# Patient Record
Sex: FEMALE | Race: WHITE | NOT HISPANIC OR LATINO | Employment: OTHER | URBAN - METROPOLITAN AREA
[De-identification: names, ages, dates, MRNs, and addresses within clinical notes are randomized per-mention and may not be internally consistent; named-entity substitution may affect disease eponyms.]

---

## 2023-10-17 ENCOUNTER — ANCILLARY PROCEDURE (OUTPATIENT)
Dept: RADIOLOGY | Facility: CLINIC | Age: 68
End: 2023-10-17
Payer: MEDICARE

## 2023-10-17 DIAGNOSIS — M25.561 ACUTE PAIN OF RIGHT KNEE: ICD-10-CM

## 2023-10-17 PROCEDURE — 73562 X-RAY EXAM OF KNEE 3: CPT | Mod: RT

## 2023-11-03 ENCOUNTER — OFFICE VISIT (OUTPATIENT)
Dept: ORTHOPEDIC SURGERY | Facility: CLINIC | Age: 68
End: 2023-11-03
Payer: MEDICARE

## 2023-11-03 VITALS — WEIGHT: 212 LBS | BODY MASS INDEX: 33.27 KG/M2 | HEIGHT: 67 IN

## 2023-11-03 DIAGNOSIS — M25.561 RIGHT MEDIAL KNEE PAIN: Primary | ICD-10-CM

## 2023-11-03 PROCEDURE — 1159F MED LIST DOCD IN RCRD: CPT | Performed by: ORTHOPAEDIC SURGERY

## 2023-11-03 PROCEDURE — 99203 OFFICE O/P NEW LOW 30 MIN: CPT | Performed by: ORTHOPAEDIC SURGERY

## 2023-11-03 PROCEDURE — 1125F AMNT PAIN NOTED PAIN PRSNT: CPT | Performed by: ORTHOPAEDIC SURGERY

## 2023-11-03 PROCEDURE — 1160F RVW MEDS BY RX/DR IN RCRD: CPT | Performed by: ORTHOPAEDIC SURGERY

## 2023-11-03 ASSESSMENT — PAIN SCALES - GENERAL: PAINLEVEL_OUTOF10: 5 - MODERATE PAIN

## 2023-11-03 ASSESSMENT — PAIN - FUNCTIONAL ASSESSMENT: PAIN_FUNCTIONAL_ASSESSMENT: 0-10

## 2023-11-03 NOTE — PROGRESS NOTES
Subjective    Patient ID: Lacie Mcneal is a 68 y.o. female.    Chief Complaint: Follow-up of the Right Knee     Last Surgery: No surgery found  Last Surgery Date: No surgery found    HPI about a 2-year history of pain in this right knee at times it can give out on her most her pain is medial but she is also unable to kneel without significant pain she has had previous injuries and falls ever since she had a back surgery    Objective   Ortho Exam range of motion is -5 to 115 degrees mild mid flexion laxity neuro vas intact skins intact no effusion no distal edema no patellofemoral crepitus or apprehension feet are normal no pain with hip motion she does have medial joint line tenderness and a positive Toby    Image Results:  XR knee right 3 views  Narrative: Interpreted By:  Chris Bowling,   STUDY:  XR KNEE RIGHT 3 VIEWS      INDICATION:  Signs/Symptoms:RIGHT KNEE PAIN      COMPARISON:  March 7, 2017      ACCESSION NUMBER(S):  [id number]      ORDERING CLINICIAN:  DENEEN MADRID      TECHNIQUE:  Views: AP, lateral and tunnel      FINDINGS:  RESULTS: There is no evidence for fracture or subluxation. No bone  lesion is noted. There is no evidence for joint effusion. No soft  tissue abnormality is identified.      Impression: Unremarkable exam.          Signed by: Chris Bowling 10/17/2023 2:29 PM  Dictation workstation:   [id number]      Assessment/Plan x-rays are essentially normal at this point she may have a medial meniscal tear with her recurvatum she also may have issues with the kneecap so we will check an MRI to make sure that the meniscus is intact  Encounter Diagnoses:  Right medial knee pain    No orders of the defined types were placed in this encounter.    No follow-ups on file.

## 2023-11-11 DIAGNOSIS — R05.3 CHRONIC COUGH: Primary | ICD-10-CM

## 2023-11-13 NOTE — TELEPHONE ENCOUNTER
Last seen 5/9/2023 and was to follow up in 3 months. Short term refill or deny? No appt scheduled at this time.

## 2023-11-14 RX ORDER — TRAMADOL HYDROCHLORIDE 50 MG/1
50 TABLET ORAL 2 TIMES DAILY PRN
Qty: 60 TABLET | Refills: 0 | Status: SHIPPED | OUTPATIENT
Start: 2023-11-14 | End: 2024-01-09 | Stop reason: SDUPTHER

## 2023-12-05 ENCOUNTER — OFFICE VISIT (OUTPATIENT)
Dept: PRIMARY CARE | Facility: CLINIC | Age: 68
End: 2023-12-05
Payer: MEDICARE

## 2023-12-05 VITALS
SYSTOLIC BLOOD PRESSURE: 138 MMHG | DIASTOLIC BLOOD PRESSURE: 58 MMHG | HEART RATE: 71 BPM | HEIGHT: 66 IN | RESPIRATION RATE: 17 BRPM | BODY MASS INDEX: 35.55 KG/M2 | OXYGEN SATURATION: 97 % | WEIGHT: 221.2 LBS

## 2023-12-05 DIAGNOSIS — M25.561 CHRONIC PAIN OF RIGHT KNEE: Primary | ICD-10-CM

## 2023-12-05 DIAGNOSIS — E66.01 OBESITY, MORBID (MULTI): ICD-10-CM

## 2023-12-05 DIAGNOSIS — M25.551 RIGHT HIP PAIN: ICD-10-CM

## 2023-12-05 DIAGNOSIS — M54.9 CHRONIC BACK PAIN, UNSPECIFIED BACK LOCATION, UNSPECIFIED BACK PAIN LATERALITY: ICD-10-CM

## 2023-12-05 DIAGNOSIS — I10 HYPERTENSION, UNSPECIFIED TYPE: ICD-10-CM

## 2023-12-05 DIAGNOSIS — M76.31 IT BAND SYNDROME, RIGHT: ICD-10-CM

## 2023-12-05 DIAGNOSIS — M46.1 SACROILIITIS (CMS-HCC): ICD-10-CM

## 2023-12-05 DIAGNOSIS — R73.03 PREDIABETES: ICD-10-CM

## 2023-12-05 DIAGNOSIS — G89.29 CHRONIC PAIN OF RIGHT KNEE: Primary | ICD-10-CM

## 2023-12-05 DIAGNOSIS — Z12.31 OTHER SCREENING MAMMOGRAM: ICD-10-CM

## 2023-12-05 DIAGNOSIS — G89.29 CHRONIC BACK PAIN, UNSPECIFIED BACK LOCATION, UNSPECIFIED BACK PAIN LATERALITY: ICD-10-CM

## 2023-12-05 DIAGNOSIS — E55.9 HYPOVITAMINOSIS D: ICD-10-CM

## 2023-12-05 PROBLEM — J45.909 ASTHMA (HHS-HCC): Status: ACTIVE | Noted: 2021-04-14

## 2023-12-05 PROBLEM — I48.0 PAF (PAROXYSMAL ATRIAL FIBRILLATION) (MULTI): Status: ACTIVE | Noted: 2021-04-28

## 2023-12-05 PROBLEM — E66.9 OBESITY: Status: ACTIVE | Noted: 2019-01-27

## 2023-12-05 PROBLEM — E78.5 DYSLIPIDEMIA: Status: ACTIVE | Noted: 2023-12-05

## 2023-12-05 PROBLEM — K21.9 CHRONIC GERD: Status: ACTIVE | Noted: 2018-06-18

## 2023-12-05 PROBLEM — M47.816 DEGENERATIVE JOINT DISEASE (DJD) OF LUMBAR SPINE: Status: ACTIVE | Noted: 2020-08-06

## 2023-12-05 PROBLEM — N39.41 URGE INCONTINENCE: Status: ACTIVE | Noted: 2023-12-05

## 2023-12-05 PROCEDURE — 1036F TOBACCO NON-USER: CPT | Performed by: STUDENT IN AN ORGANIZED HEALTH CARE EDUCATION/TRAINING PROGRAM

## 2023-12-05 PROCEDURE — 3078F DIAST BP <80 MM HG: CPT | Performed by: STUDENT IN AN ORGANIZED HEALTH CARE EDUCATION/TRAINING PROGRAM

## 2023-12-05 PROCEDURE — 1160F RVW MEDS BY RX/DR IN RCRD: CPT | Performed by: STUDENT IN AN ORGANIZED HEALTH CARE EDUCATION/TRAINING PROGRAM

## 2023-12-05 PROCEDURE — 99204 OFFICE O/P NEW MOD 45 MIN: CPT | Performed by: STUDENT IN AN ORGANIZED HEALTH CARE EDUCATION/TRAINING PROGRAM

## 2023-12-05 PROCEDURE — 1125F AMNT PAIN NOTED PAIN PRSNT: CPT | Performed by: STUDENT IN AN ORGANIZED HEALTH CARE EDUCATION/TRAINING PROGRAM

## 2023-12-05 PROCEDURE — 3075F SYST BP GE 130 - 139MM HG: CPT | Performed by: STUDENT IN AN ORGANIZED HEALTH CARE EDUCATION/TRAINING PROGRAM

## 2023-12-05 PROCEDURE — 1159F MED LIST DOCD IN RCRD: CPT | Performed by: STUDENT IN AN ORGANIZED HEALTH CARE EDUCATION/TRAINING PROGRAM

## 2023-12-05 RX ORDER — HYDROCHLOROTHIAZIDE 12.5 MG/1
CAPSULE ORAL
COMMUNITY
Start: 2018-06-18 | End: 2023-12-05 | Stop reason: SDUPTHER

## 2023-12-05 RX ORDER — LOSARTAN POTASSIUM 100 MG/1
100 TABLET ORAL
COMMUNITY
Start: 2023-10-23 | End: 2024-10-22

## 2023-12-05 RX ORDER — ALBUTEROL SULFATE 90 UG/1
2 AEROSOL, METERED RESPIRATORY (INHALATION) 4 TIMES DAILY
COMMUNITY
Start: 2019-10-04

## 2023-12-05 RX ORDER — DILTIAZEM HYDROCHLORIDE 180 MG/1
180 CAPSULE, EXTENDED RELEASE ORAL DAILY
COMMUNITY

## 2023-12-05 RX ORDER — HYDROCHLOROTHIAZIDE 12.5 MG/1
12.5 CAPSULE ORAL EVERY MORNING
Qty: 90 CAPSULE | Refills: 1 | Status: SHIPPED | OUTPATIENT
Start: 2023-12-05 | End: 2024-06-05 | Stop reason: DRUGHIGH

## 2023-12-05 RX ORDER — ATORVASTATIN CALCIUM 40 MG/1
40 TABLET, FILM COATED ORAL
COMMUNITY
Start: 2021-11-11

## 2023-12-05 RX ORDER — TIZANIDINE HYDROCHLORIDE 4 MG/1
4 CAPSULE, GELATIN COATED ORAL 3 TIMES DAILY
Qty: 90 CAPSULE | Refills: 11 | Status: SHIPPED | OUTPATIENT
Start: 2023-12-05 | End: 2024-06-05 | Stop reason: WASHOUT

## 2023-12-05 ASSESSMENT — PATIENT HEALTH QUESTIONNAIRE - PHQ9
1. LITTLE INTEREST OR PLEASURE IN DOING THINGS: NOT AT ALL
SUM OF ALL RESPONSES TO PHQ9 QUESTIONS 1 AND 2: 0
2. FEELING DOWN, DEPRESSED OR HOPELESS: NOT AT ALL

## 2023-12-05 NOTE — PROGRESS NOTES
History Of Present Illness  Lacie Mcneal is a 68 y.o. female presents for establishment.     Prediabetes   A1C 6.1%     HTN  Controlled   Following with cardiology     AF   s/p DCC 2022, s/p ablation 2023.   Failed ablation and was started on amiodarone which as since been discontinued   AC with Eliquis 5 BID   echo 2021 showed EF 62%, normal atrial size     Adria Vogel MD, ENT   Tramadol for chronic cough      Hip Pain on the right   Radiation forward in the groin and thigh area x 1 week   No trauma, no falls, no change in motion   No urinary changes, no history of nephrolithiasis   Worsening   Sharp, achy pain   9/10 persistently     Chronic Knee Pain   Following with Dr. Liz who is retiring   MR pending   Referral needed for new Ortho   Pt declined PT due to Copay cost     History of back surgery 15 years ago     Obesity    Colonoscopy  with Dr. Jackson  Dexa    Mammogram   echo 2021 showed EF 62%, normal atrial size   Past Medical History  She has a past medical history of Allergic, GERD (gastroesophageal reflux disease) (), and Hypertension ().    Surgical History  She has a past surgical history that includes Appendectomy (); Back surgery (); Cholecystectomy ();  section, low transverse (, , ); Cardiac surgery (); Spine surgery (); Tubal ligation (); and [place] tooth extraction ().     Social History  She reports that she has never smoked. She has never used smokeless tobacco. She reports current alcohol use. She reports that she does not use drugs.    Family History  Family History   Problem Relation Name Age of Onset    Arthritis Mother Jacqueline     Cancer Mother Jacqueline         Lung, tobacco use    Heart disease Mother Jacqueline     Hypertension Mother Jacqueline     Stroke Mother Jacqueline     Cancer Father Zachery         Prostate    Colon cancer Father Zachery         Allergies  Erythromycin, Penicillins, and Sulfa  (sulfonamide antibiotics)    Review of Systems     Physical Exam  Vitals reviewed.   Constitutional:       General: She is not in acute distress.     Appearance: She is not ill-appearing.   HENT:      Right Ear: Tympanic membrane and ear canal normal.      Left Ear: Tympanic membrane and ear canal normal.      Mouth/Throat:      Mouth: Mucous membranes are moist.      Pharynx: Oropharynx is clear. No oropharyngeal exudate or posterior oropharyngeal erythema.   Eyes:      Extraocular Movements: Extraocular movements intact.      Conjunctiva/sclera: Conjunctivae normal.      Pupils: Pupils are equal, round, and reactive to light.   Neck:      Vascular: No carotid bruit.   Cardiovascular:      Rate and Rhythm: Normal rate and regular rhythm.      Heart sounds: No murmur heard.     No gallop.   Pulmonary:      Effort: Pulmonary effort is normal.      Breath sounds: Normal breath sounds. No wheezing, rhonchi or rales.   Abdominal:      General: Abdomen is flat. Bowel sounds are normal.      Palpations: Abdomen is soft.      Tenderness: There is no abdominal tenderness.   Musculoskeletal:      Cervical back: Neck supple.      Left lower leg: No edema.      Comments: Right IT band tenderness with palpation mid thigh   Straight leg testing positive on the R back   Skin:     General: Skin is warm and dry.      Findings: No rash.   Neurological:      General: No focal deficit present.      Mental Status: She is alert and oriented to person, place, and time.      Gait: Gait normal.   Psychiatric:         Mood and Affect: Mood normal.         Behavior: Behavior normal.        Last Recorded Vitals  /58   Pulse 71   Resp 17   Wt 100 kg (221 lb 3.2 oz)   SpO2 97%     Relevant Results    Current Outpatient Medications:     albuterol 90 mcg/actuation inhaler, Inhale 2 puffs 4 times a day., Disp: , Rfl:     apixaban (Eliquis) 5 mg tablet, Take 1 tablet (5 mg) by mouth twice a day., Disp: , Rfl:     atorvastatin (Lipitor) 40  mg tablet, Take 1 tablet (40 mg) by mouth., Disp: , Rfl:     dilTIAZem XR (Dilacor XR) 180 mg 24 hr capsule, Take 1 capsule (180 mg) by mouth once daily., Disp: , Rfl:     losartan (Cozaar) 100 mg tablet, Take 1 tablet (100 mg) by mouth once daily., Disp: , Rfl:     multivitamin capsule, Take 1 capsule by mouth once daily., Disp: , Rfl:     traMADol (Ultram) 50 mg tablet, TAKE 1 TABLET BY MOUTH TWICE A DAY AS NEEDED, Disp: 60 tablet, Rfl: 0    hydroCHLOROthiazide (Microzide) 12.5 mg capsule, Take 1 capsule (12.5 mg) by mouth once daily in the morning., Disp: 90 capsule, Rfl: 1    tiZANidine (Zanaflex) 4 mg capsule, Take 1 capsule (4 mg) by mouth 3 times a day., Disp: 90 capsule, Rfl: 11        Assessment/Plan   Problem List Items Addressed This Visit             ICD-10-CM    Chronic pain of right knee - Primary M25.561, G89.29    Relevant Orders    Referral to Orthopaedic Surgery    Hypovitaminosis D E55.9    Relevant Orders    Vitamin D 25-Hydroxy,Total (for eval of Vitamin D levels)    Obesity, morbid (CMS/HCC) E66.01    Sacroiliitis (CMS/HCC) M46.1     Other Visit Diagnoses         Codes    Right hip pain     M25.551    Relevant Orders    Referral to Orthopaedic Surgery    XR hip right with pelvis when performed 2 or 3 views    Chronic back pain, unspecified back location, unspecified back pain laterality     M54.9, G89.29    Relevant Orders    XR lumbar spine 2-3 views    Hypertension, unspecified type     I10    Relevant Medications    hydroCHLOROthiazide (Microzide) 12.5 mg capsule    Other Relevant Orders    CBC and Auto Differential    Comprehensive metabolic panel    Lipid Panel    Hemoglobin A1C    Other screening mammogram     Z12.31    Relevant Orders    BI mammo bilateral screening tomosynthesis    Prediabetes     R73.03    Relevant Orders    Hemoglobin A1C    It band syndrome, right     M76.31    Relevant Medications    tiZANidine (Zanaflex) 4 mg capsule          Lacie Arguelles is a 68-year-old female  with a past medical history of hypertension, hyperlipidemia, A-fib (s/p ablation 4/23), prediabetes (A1c 6.1), chronic low back pain, osteoarthritis of the right knee who presents today for establishment of care.    HTN   138/58 mmHg   Controlled today  Continue losartan 100 mg daily  Continue hydrochlorothiazide 12.5 mg daily  Continue diltiazem 180 mg daily    IT band tendinitis  Patient has marked tenderness along the IT band mainly mid thigh  Exercises and stretches given  Tizanidine 4 mg 3 times daily given    Osteoarthritis of the right knee  Was following with Dr. Liz who is currently retiring a referral was placed for new orthopod  Patient refuses physical therapy due it being a bit cost prohibitive for her  MRI is pending  Due to patient's change in gait likely causing some hip and back pain we will update x-rays of both the right hip and the lumbar spine    Hyperlipidemia  Lipid panel ordered  Continue Lipitor 40 mg daily    Atrial fibrillation  Following with cardiology  s/p DCC 02/2022, s/p ablation 04/2023  echo 6/2021 showed EF 62%, normal atrial size   Failed ablation and was started on amiodarone which as since been discontinued   AC with Eliquis 5 BID, advised patient of [facility] for more affordable meds    Chronic cough  Following with Adria Vogel MD, ENT   Tramadol for chronic cough     Prediabetes  Patient denies actual diagnosis of diabetes always has been on the borderline  A1c was 6.1% we will repeat today    Hx of SVT  S/p RFA 2003  Health maintenance  Mammogram ordered  Colonoscopy to be obtained from Dr. Reyes's office  DEXA up-to-date    Return to care in 6 months for hypertension follow-up         Tammy Cruz DO

## 2023-12-07 ENCOUNTER — APPOINTMENT (OUTPATIENT)
Dept: ORTHOPEDIC SURGERY | Facility: CLINIC | Age: 68
End: 2023-12-07
Payer: MEDICARE

## 2023-12-12 ENCOUNTER — HOSPITAL ENCOUNTER (OUTPATIENT)
Dept: RADIOLOGY | Facility: HOSPITAL | Age: 68
Discharge: HOME | End: 2023-12-12
Payer: MEDICARE

## 2023-12-12 DIAGNOSIS — M25.561 RIGHT MEDIAL KNEE PAIN: ICD-10-CM

## 2023-12-12 PROCEDURE — 73721 MRI JNT OF LWR EXTRE W/O DYE: CPT | Mod: RT

## 2023-12-14 ENCOUNTER — ANCILLARY PROCEDURE (OUTPATIENT)
Dept: RADIOLOGY | Facility: CLINIC | Age: 68
End: 2023-12-14
Payer: MEDICARE

## 2023-12-14 VITALS — BODY MASS INDEX: 33.74 KG/M2 | HEIGHT: 67 IN | WEIGHT: 215 LBS

## 2023-12-14 DIAGNOSIS — Z12.31 OTHER SCREENING MAMMOGRAM: ICD-10-CM

## 2023-12-14 PROCEDURE — 77063 BREAST TOMOSYNTHESIS BI: CPT

## 2023-12-15 ENCOUNTER — LAB (OUTPATIENT)
Dept: LAB | Facility: LAB | Age: 68
End: 2023-12-15
Payer: MEDICARE

## 2023-12-15 ENCOUNTER — ANCILLARY PROCEDURE (OUTPATIENT)
Dept: RADIOLOGY | Facility: CLINIC | Age: 68
End: 2023-12-15
Payer: MEDICARE

## 2023-12-15 DIAGNOSIS — E55.9 HYPOVITAMINOSIS D: ICD-10-CM

## 2023-12-15 DIAGNOSIS — G89.29 CHRONIC BACK PAIN, UNSPECIFIED BACK LOCATION, UNSPECIFIED BACK PAIN LATERALITY: ICD-10-CM

## 2023-12-15 DIAGNOSIS — R73.03 PREDIABETES: ICD-10-CM

## 2023-12-15 DIAGNOSIS — I10 HYPERTENSION, UNSPECIFIED TYPE: ICD-10-CM

## 2023-12-15 DIAGNOSIS — M54.9 CHRONIC BACK PAIN, UNSPECIFIED BACK LOCATION, UNSPECIFIED BACK PAIN LATERALITY: ICD-10-CM

## 2023-12-15 DIAGNOSIS — M25.551 RIGHT HIP PAIN: ICD-10-CM

## 2023-12-15 LAB
ALBUMIN SERPL BCP-MCNC: 4.1 G/DL (ref 3.4–5)
ALP SERPL-CCNC: 113 U/L (ref 33–136)
ALT SERPL W P-5'-P-CCNC: 14 U/L (ref 7–45)
ANION GAP SERPL CALC-SCNC: 16 MMOL/L (ref 10–20)
AST SERPL W P-5'-P-CCNC: 16 U/L (ref 9–39)
BASOPHILS # BLD AUTO: 0.03 X10*3/UL (ref 0–0.1)
BASOPHILS NFR BLD AUTO: 0.5 %
BILIRUB SERPL-MCNC: 0.4 MG/DL (ref 0–1.2)
BUN SERPL-MCNC: 17 MG/DL (ref 6–23)
CALCIUM SERPL-MCNC: 9.4 MG/DL (ref 8.6–10.3)
CHLORIDE SERPL-SCNC: 104 MMOL/L (ref 98–107)
CHOLEST SERPL-MCNC: 225 MG/DL (ref 0–199)
CHOLESTEROL/HDL RATIO: 2.9
CO2 SERPL-SCNC: 25 MMOL/L (ref 21–32)
CREAT SERPL-MCNC: 1.17 MG/DL (ref 0.5–1.05)
EOSINOPHIL # BLD AUTO: 0.21 X10*3/UL (ref 0–0.7)
EOSINOPHIL NFR BLD AUTO: 3.2 %
ERYTHROCYTE [DISTWIDTH] IN BLOOD BY AUTOMATED COUNT: 14.3 % (ref 11.5–14.5)
GFR SERPL CREATININE-BSD FRML MDRD: 50 ML/MIN/1.73M*2
GLUCOSE SERPL-MCNC: 99 MG/DL (ref 74–99)
HCT VFR BLD AUTO: 41 % (ref 36–46)
HDLC SERPL-MCNC: 78.9 MG/DL
HGB BLD-MCNC: 12.6 G/DL (ref 12–16)
IMM GRANULOCYTES # BLD AUTO: 0.02 X10*3/UL (ref 0–0.7)
IMM GRANULOCYTES NFR BLD AUTO: 0.3 % (ref 0–0.9)
LDLC SERPL CALC-MCNC: 126 MG/DL
LYMPHOCYTES # BLD AUTO: 1.52 X10*3/UL (ref 1.2–4.8)
LYMPHOCYTES NFR BLD AUTO: 22.8 %
MCH RBC QN AUTO: 27.1 PG (ref 26–34)
MCHC RBC AUTO-ENTMCNC: 30.7 G/DL (ref 32–36)
MCV RBC AUTO: 88 FL (ref 80–100)
MONOCYTES # BLD AUTO: 0.42 X10*3/UL (ref 0.1–1)
MONOCYTES NFR BLD AUTO: 6.3 %
NEUTROPHILS # BLD AUTO: 4.46 X10*3/UL (ref 1.2–7.7)
NEUTROPHILS NFR BLD AUTO: 66.9 %
NON HDL CHOLESTEROL: 146 MG/DL (ref 0–149)
NRBC BLD-RTO: 0 /100 WBCS (ref 0–0)
PLATELET # BLD AUTO: 378 X10*3/UL (ref 150–450)
POTASSIUM SERPL-SCNC: 3.9 MMOL/L (ref 3.5–5.3)
PROT SERPL-MCNC: 7.3 G/DL (ref 6.4–8.2)
RBC # BLD AUTO: 4.65 X10*6/UL (ref 4–5.2)
SODIUM SERPL-SCNC: 141 MMOL/L (ref 136–145)
TRIGL SERPL-MCNC: 99 MG/DL (ref 0–149)
VLDL: 20 MG/DL (ref 0–40)
WBC # BLD AUTO: 6.7 X10*3/UL (ref 4.4–11.3)

## 2023-12-15 PROCEDURE — 72110 X-RAY EXAM L-2 SPINE 4/>VWS: CPT | Mod: FY

## 2023-12-15 PROCEDURE — 82306 VITAMIN D 25 HYDROXY: CPT

## 2023-12-15 PROCEDURE — 83036 HEMOGLOBIN GLYCOSYLATED A1C: CPT

## 2023-12-15 PROCEDURE — 85025 COMPLETE CBC W/AUTO DIFF WBC: CPT

## 2023-12-15 PROCEDURE — 80053 COMPREHEN METABOLIC PANEL: CPT

## 2023-12-15 PROCEDURE — 73502 X-RAY EXAM HIP UNI 2-3 VIEWS: CPT | Mod: RIGHT SIDE | Performed by: RADIOLOGY

## 2023-12-15 PROCEDURE — 73502 X-RAY EXAM HIP UNI 2-3 VIEWS: CPT | Mod: RT

## 2023-12-15 PROCEDURE — 72110 X-RAY EXAM L-2 SPINE 4/>VWS: CPT | Performed by: RADIOLOGY

## 2023-12-15 PROCEDURE — 36415 COLL VENOUS BLD VENIPUNCTURE: CPT

## 2023-12-15 PROCEDURE — 80061 LIPID PANEL: CPT

## 2023-12-16 LAB
25(OH)D3 SERPL-MCNC: 24 NG/ML (ref 30–100)
EST. AVERAGE GLUCOSE BLD GHB EST-MCNC: 108 MG/DL
HBA1C MFR BLD: 5.4 %

## 2023-12-26 ENCOUNTER — PATIENT MESSAGE (OUTPATIENT)
Dept: PRIMARY CARE | Facility: CLINIC | Age: 68
End: 2023-12-26
Payer: MEDICARE

## 2023-12-26 DIAGNOSIS — Z00.00 WELLNESS EXAMINATION: ICD-10-CM

## 2023-12-26 DIAGNOSIS — E55.9 HYPOVITAMINOSIS D: Primary | ICD-10-CM

## 2023-12-28 RX ORDER — ERGOCALCIFEROL 1.25 MG/1
50000 CAPSULE ORAL
Qty: 12 CAPSULE | Refills: 0 | Status: SHIPPED | OUTPATIENT
Start: 2023-12-28 | End: 2024-03-13

## 2024-01-06 ENCOUNTER — LAB (OUTPATIENT)
Dept: LAB | Facility: LAB | Age: 69
End: 2024-01-06
Payer: MEDICARE

## 2024-01-06 DIAGNOSIS — Z00.00 WELLNESS EXAMINATION: ICD-10-CM

## 2024-01-06 LAB
T4 FREE SERPL-MCNC: 1.15 NG/DL (ref 0.61–1.12)
TSH SERPL-ACNC: 0.1 MIU/L (ref 0.44–3.98)

## 2024-01-06 PROCEDURE — 84443 ASSAY THYROID STIM HORMONE: CPT

## 2024-01-06 PROCEDURE — 84439 ASSAY OF FREE THYROXINE: CPT

## 2024-01-06 PROCEDURE — 36415 COLL VENOUS BLD VENIPUNCTURE: CPT

## 2024-01-09 ENCOUNTER — TELEPHONE (OUTPATIENT)
Dept: OTOLARYNGOLOGY | Facility: CLINIC | Age: 69
End: 2024-01-09
Payer: MEDICARE

## 2024-01-09 DIAGNOSIS — R05.3 CHRONIC COUGH: Primary | ICD-10-CM

## 2024-01-09 RX ORDER — TRAMADOL HYDROCHLORIDE 50 MG/1
50 TABLET ORAL 2 TIMES DAILY PRN
Qty: 60 TABLET | Refills: 0 | Status: SHIPPED | OUTPATIENT
Start: 2024-01-09 | End: 2024-01-30 | Stop reason: SDUPTHER

## 2024-01-16 ENCOUNTER — ANCILLARY PROCEDURE (OUTPATIENT)
Dept: RADIOLOGY | Facility: CLINIC | Age: 69
End: 2024-01-16
Payer: MEDICARE

## 2024-01-16 DIAGNOSIS — J32.9 CHRONIC SINUSITIS, UNSPECIFIED: ICD-10-CM

## 2024-01-16 PROCEDURE — 70486 CT MAXILLOFACIAL W/O DYE: CPT

## 2024-01-16 PROCEDURE — 70450 CT HEAD/BRAIN W/O DYE: CPT

## 2024-01-24 ENCOUNTER — OFFICE VISIT (OUTPATIENT)
Dept: ORTHOPEDIC SURGERY | Facility: CLINIC | Age: 69
End: 2024-01-24
Payer: MEDICARE

## 2024-01-24 DIAGNOSIS — G89.29 CHRONIC PAIN OF RIGHT KNEE: Primary | ICD-10-CM

## 2024-01-24 DIAGNOSIS — M25.551 RIGHT HIP PAIN: ICD-10-CM

## 2024-01-24 DIAGNOSIS — M25.561 CHRONIC PAIN OF RIGHT KNEE: Primary | ICD-10-CM

## 2024-01-24 PROCEDURE — 1159F MED LIST DOCD IN RCRD: CPT | Performed by: ORTHOPAEDIC SURGERY

## 2024-01-24 PROCEDURE — 1160F RVW MEDS BY RX/DR IN RCRD: CPT | Performed by: ORTHOPAEDIC SURGERY

## 2024-01-24 PROCEDURE — 99214 OFFICE O/P EST MOD 30 MIN: CPT | Performed by: ORTHOPAEDIC SURGERY

## 2024-01-24 PROCEDURE — 20610 DRAIN/INJ JOINT/BURSA W/O US: CPT | Performed by: ORTHOPAEDIC SURGERY

## 2024-01-24 PROCEDURE — 1125F AMNT PAIN NOTED PAIN PRSNT: CPT | Performed by: ORTHOPAEDIC SURGERY

## 2024-01-24 PROCEDURE — 1036F TOBACCO NON-USER: CPT | Performed by: ORTHOPAEDIC SURGERY

## 2024-01-24 RX ORDER — TRIAMCINOLONE ACETONIDE 40 MG/ML
1 INJECTION, SUSPENSION INTRA-ARTICULAR; INTRAMUSCULAR
Status: COMPLETED | OUTPATIENT
Start: 2024-01-24 | End: 2024-01-24

## 2024-01-24 RX ORDER — MELOXICAM 15 MG/1
15 TABLET ORAL DAILY
Qty: 30 TABLET | Refills: 11 | Status: SHIPPED | OUTPATIENT
Start: 2024-01-24 | End: 2024-06-05 | Stop reason: WASHOUT

## 2024-01-24 RX ADMIN — TRIAMCINOLONE ACETONIDE 1 ML: 40 INJECTION, SUSPENSION INTRA-ARTICULAR; INTRAMUSCULAR at 12:55

## 2024-01-24 NOTE — PROGRESS NOTES
This is a consultation from Dr. Tammy Cruz DO for   Chief Complaint   Patient presents with   • Right Hip - Pain       This is a 68 y.o. female who presents for evaluation of right knee pain.  Patient says he has pain in the right knee for many months, this is a chronic issue for her.  She complains of sharp pain in the medial knee worse with walking proving with rest.  She has never had surgery or injections in the knee.  No numbness or tingling no fevers no chills no shooting pain of her leg.  She takes over-the-counter anti-inflammatories.    Physical Exam    There has been no interval change in this patient's past medical, surgical, medications, allergies, family history or social history since the most recent visit to a provider within our department. 14 point review of systems was performed, reviewed, and negative except for pertinent positives documented in the history of present illness.     Constitutional: well developed, well nourished female in no acute distress  Psychiatric: normal mood, appropriate affect  Eyes: sclera anicteric  HENT: normocephalic/atraumatic  CV: regular rate and rhythm   Respiratory: non labored breathing  Integumentary: no rash  Neurological: moves all extremities    Right knee exam: skin intact no lacerations or abrations.  1+ effusion.  Tender medial joint line. negative log roll negative patellar grind. ROM 0-120. stable to varus and valgus stress at 0 and 30 degrees. negative lachman negative posterior drawer negative deja. 5/5 ehl/fhl/gs/ta. silt s/s/sp/dp/t. 2+ dp/pt        Xrays were ordered by me, they were reviewed and independently interpreted by me today, they show no significant degenerative changes, joint spaces well-maintained.  MRI of the right knee shows small focal cartilage defects.    L Inj/Asp: R knee on 1/24/2024 12:55 PM  Indications: pain and joint swelling  Details: 22 G needle, anterolateral approach  Medications: 1 mL triamcinolone acetonide 40  mg/mL    Discussion:  I discussed the conservative treatment options for knee osteoarthritis including but not limited to physical therapy, oral NSAIDS, activity and lifestyle modification, and corticosteroid injections. Pt has elected to undergo a cortisone injection today. I have explained the risk and benefits of an injection including the possibility of joint infection, bleeding, damage to cartilage, allergic reaction. Patient verbalized understanding and gave verbal consent wishes to proceed with a intra-articular cortisone injection for their knee.    Procedure:  After discussing the risk and benefits of the procedure, we proceeded with an intra-articular right knee injection. We discussed the risks and benefits and potential morbidity related to the treatment, and to the prescription medication administered in the injection    With the patient's informed verbal consent, the right knee was prepped in standard sterile fashion with Chlorhexidine. The skin was then anesthetized with ethyl chloride spray and cleaned again with Chlorhexidine. The knee was then apirated/injected with a prefilled 20-gauge syringe of 40 mg Kenalog + 4 ml Lidocaine using the lateral approach without complications.  The patient tolerated this well and felt immediate initial relief of symptoms. A bandaid was applied and the patient ambulated out of the clinic on ther own accord without difficulty. Patient was instructed to avoid physical activity for 24-48 hours to prevent the knees from swelling and may ice the knees as tolerated. Patient should contact the office if any signs of of infection appear: redness, fever, chills, drainage, swelling or warmth to the knees.  Pt understands that the injections can be repeated no sooner than 3 months.    Procedure, treatment alternatives, risks and benefits explained, specific risks discussed. Consent was given by the patient. Immediately prior to procedure a time out was called to verify the  "correct patient, procedure, equipment, support staff and site/side marked as required. Patient was prepped and draped in the usual sterile fashion.         Impression/Plan: This is a 68 y.o. female with mild right knee arthritis.  I had an in depth discussion with the patient regarding treatment options for arthritis and their relative risks and benefits. We reviewed surgical and nonsurgical option for treatment. Treatments include anti inflammatory medications, physical therapy, weight loss, activity modification, use of assistive devices, injection therapies. We discussed current prescriptions and risks and benefits of continuation of prescription medication as apporpriate. We discussed that arthritis is often progressive over time, an in end stage arthritis surgical interventions can be considered, including arthroplasty. All questions were answered and the patient voiced their understanding.  I will see her back as needed    BMI Readings from Last 1 Encounters:   12/14/23 33.67 kg/m²      Lab Results   Component Value Date    CREATININE 1.17 (H) 12/15/2023     Tobacco Use: Low Risk  (1/16/2024)    Patient History    • Smoking Tobacco Use: Never    • Smokeless Tobacco Use: Never    • Passive Exposure: Not on file      Computed MELD 3.0 unavailable. Necessary lab results were not found in the last year.  Computed MELD-Na unavailable. Necessary lab results were not found in the last year.       Lab Results   Component Value Date    HGBA1C 5.4 12/15/2023     No results found for: \"STAPHMRSASCR\"  "

## 2024-01-30 ENCOUNTER — OFFICE VISIT (OUTPATIENT)
Dept: OTOLARYNGOLOGY | Facility: CLINIC | Age: 69
End: 2024-01-30
Payer: MEDICARE

## 2024-01-30 VITALS — HEIGHT: 67 IN | WEIGHT: 218 LBS | BODY MASS INDEX: 34.21 KG/M2

## 2024-01-30 DIAGNOSIS — R05.3 CHRONIC COUGH: Primary | ICD-10-CM

## 2024-01-30 PROCEDURE — 1160F RVW MEDS BY RX/DR IN RCRD: CPT | Performed by: OTOLARYNGOLOGY

## 2024-01-30 PROCEDURE — 1159F MED LIST DOCD IN RCRD: CPT | Performed by: OTOLARYNGOLOGY

## 2024-01-30 PROCEDURE — 99213 OFFICE O/P EST LOW 20 MIN: CPT | Performed by: OTOLARYNGOLOGY

## 2024-01-30 PROCEDURE — 1125F AMNT PAIN NOTED PAIN PRSNT: CPT | Performed by: OTOLARYNGOLOGY

## 2024-01-30 PROCEDURE — 1036F TOBACCO NON-USER: CPT | Performed by: OTOLARYNGOLOGY

## 2024-01-30 RX ORDER — TRAMADOL HYDROCHLORIDE 50 MG/1
50 TABLET ORAL 2 TIMES DAILY PRN
Qty: 60 TABLET | Refills: 2 | Status: SHIPPED | OUTPATIENT
Start: 2024-01-30

## 2024-01-30 ASSESSMENT — ENCOUNTER SYMPTOMS
DEPRESSION: 0
LOSS OF SENSATION IN FEET: 0
OCCASIONAL FEELINGS OF UNSTEADINESS: 1

## 2024-01-30 ASSESSMENT — PATIENT HEALTH QUESTIONNAIRE - PHQ9
SUM OF ALL RESPONSES TO PHQ9 QUESTIONS 1 AND 2: 0
1. LITTLE INTEREST OR PLEASURE IN DOING THINGS: NOT AT ALL
2. FEELING DOWN, DEPRESSED OR HOPELESS: NOT AT ALL

## 2024-01-30 NOTE — PROGRESS NOTES
Chief Complaint   Patient presents with    Follow-up     FUV- Tramadol refill      Date of Evaluation: 2024   HPI  She returns in follow-up for neurogenic cough.  She is controlled with tramadol 50 mg twice daily.  When she tries to go without she has a prompt return of her cough  Lacie Mcneal is a 68 y.o. female  in follow-up for her several year history of chronic nonproductive cough. Almost immediately with starting tramadol the cough has resolved. She is currently at 50 mg nightly with good cough control. She is on amiodarone and tolerating both well. She underwent a cardiac ablation for her longstanding A-fib and is now under control. She had a bottle of tramadol pills wash down the sink by accident  History: This is a 68-year-old white female with a several year history of a chronic nonproductive cough. She denies any shortness of breath hemoptysis dysphagia odynophagia weight loss or change in her voice. She has seen pulmonary and had a negative chest CT and no benefit from inhalers. She had a GI work-up that was negative. She has tried Zyrtec. No sinus complaints. No shortness of breath       Past Medical History:   Diagnosis Date    Allergic     GERD (gastroesophageal reflux disease)     Hypertension       Past Surgical History:   Procedure Laterality Date    APPENDECTOMY  1973    BACK SURGERY      CARDIAC SURGERY       SECTION, LOW TRANSVERSE  , ,     CHOLECYSTECTOMY      HYSTERECTOMY      SPINE SURGERY  2018    TUBAL LIGATION  1982    WISDOM TOOTH EXTRACTION  1974          Medications:   Current Outpatient Medications   Medication Instructions    albuterol 90 mcg/actuation inhaler 2 puffs, inhalation, 4 times daily    apixaban (ELIQUIS) 5 mg, oral, 2 times daily    atorvastatin (LIPITOR) 40 mg, oral    dilTIAZem XR (DILACOR XR) 180 mg, oral, Daily    ergocalciferol (VITAMIN D-2) 50,000 Units, oral, Weekly    hydroCHLOROthiazide (MICROZIDE) 12.5 mg, oral,  "Every morning    losartan (COZAAR) 100 mg, oral, Daily RT    meloxicam (MOBIC) 15 mg, oral, Daily    multivitamin capsule 1 capsule, oral, Daily RT    tiZANidine (ZANAFLEX) 4 mg, oral, 3 times daily    traMADol (ULTRAM) 50 mg, oral, 2 times daily PRN        Allergies:  Allergies   Allergen Reactions    Erythromycin Other, Hives and Rash    Penicillins Hives, Itching, Rash, Swelling and Unknown    Sulfa (Sulfonamide Antibiotics) Hives, Itching, Rash, Swelling and Unknown        Physical Exam:  Last Recorded Vitals  Height 1.702 m (5' 7\"), weight 98.9 kg (218 lb).  []General appearance: Well-developed, well-nourished in no acute distress, conversant with normal voice quality    Head/face: No erythema or edema or facial tenderness, and normal facial nerve function bilaterally    External ear: Clear external auditory canals with normal pinnae  Tube status: N/A  Middle ear: Tympanic membranes intact and mobile, middle ears normal.  Tympanic membrane perforation: N/A  Mastoid bowl: N/A  Hearing: Normal conversational awareness at normal speech thresholds    Nose visualized using: Anterior rhinoscopy  Nasal dorsum: Nontraumatic midline appearance  Septum: Midline, nonobstructing  Inferior turbinates: Normal, pink  Secretions: Dry    Oral cavity and oropharynx: Normal  Teeth: Good condition  Floor of mouth: without lesions  Palate: Normal hard palate, soft palate and uvula  Oropharynx: Clear, no lesions present  Buccal mucosa: Normal without masses or lesions  Lips: Normal    Nasopharynx: Inadequate mirror exam secondary to gag/anatomy    Larynx and hypopharynx: Mirror examination adequate and revealed epiglottis normal, vocal cord mobility normal, normal mucosa, false vocal cords normal, true vocal cords normal    Neck:  Salivary glands: Normal bilateral parotid and submandibular glands by inspection and palpation.  Non-thyroid masses: No palpable masses or significant lymphadenopathy  Trachea: Midline  Thyroid: No " thyromegaly or palpable nodules  Temporomandibular joint: Nontender  Cervical range of motion: Normal    Heart: Regular rate and rhythm, no murmurs, rubs, gallops  Lungs: Clear to auscultation bilaterally, normal respiratory effort    Neurologic exam: Alert and oriented x3, appropriate affect.  Cranial nerves II-XII normal bilaterally  Extraocular movement: Extraocular movement intact, normal gaze alignment            Lacie was seen today for follow-up.  Diagnoses and all orders for this visit:  Chronic cough (Primary)  -     traMADol (Ultram) 50 mg tablet; Take 1 tablet (50 mg) by mouth 2 times a day as needed (cough).       PLAN  Her neurogenic cough is controlled with tramadol.  She will continue to strive towards minimal effective dose.  Recheck in 3 months    Adria Vogel MD

## 2024-03-13 DIAGNOSIS — E55.9 HYPOVITAMINOSIS D: ICD-10-CM

## 2024-03-13 RX ORDER — ERGOCALCIFEROL 1.25 MG/1
50000 CAPSULE ORAL
Qty: 12 CAPSULE | Refills: 0 | Status: SHIPPED | OUTPATIENT
Start: 2024-03-13 | End: 2024-06-04

## 2024-06-03 DIAGNOSIS — E55.9 HYPOVITAMINOSIS D: ICD-10-CM

## 2024-06-04 RX ORDER — ERGOCALCIFEROL 1.25 MG/1
50000 CAPSULE ORAL
Qty: 12 CAPSULE | Refills: 1 | Status: SHIPPED | OUTPATIENT
Start: 2024-06-09

## 2024-06-05 ENCOUNTER — LAB (OUTPATIENT)
Dept: LAB | Facility: LAB | Age: 69
End: 2024-06-05
Payer: MEDICARE

## 2024-06-05 ENCOUNTER — OFFICE VISIT (OUTPATIENT)
Dept: PRIMARY CARE | Facility: CLINIC | Age: 69
End: 2024-06-05
Payer: MEDICARE

## 2024-06-05 VITALS
HEART RATE: 84 BPM | RESPIRATION RATE: 16 BRPM | SYSTOLIC BLOOD PRESSURE: 118 MMHG | OXYGEN SATURATION: 95 % | DIASTOLIC BLOOD PRESSURE: 56 MMHG | HEIGHT: 67 IN | BODY MASS INDEX: 33.24 KG/M2 | WEIGHT: 211.8 LBS

## 2024-06-05 DIAGNOSIS — I10 HYPERTENSION, UNSPECIFIED TYPE: ICD-10-CM

## 2024-06-05 DIAGNOSIS — Z86.39 H/O: OBESITY: ICD-10-CM

## 2024-06-05 DIAGNOSIS — E66.01 OBESITY, MORBID (MULTI): ICD-10-CM

## 2024-06-05 DIAGNOSIS — M96.1 POSTLAMINECTOMY SYNDROME, LUMBAR REGION: ICD-10-CM

## 2024-06-05 DIAGNOSIS — E55.9 HYPOVITAMINOSIS D: ICD-10-CM

## 2024-06-05 DIAGNOSIS — J45.909 ASTHMA, UNSPECIFIED ASTHMA SEVERITY, UNSPECIFIED WHETHER COMPLICATED, UNSPECIFIED WHETHER PERSISTENT (HHS-HCC): ICD-10-CM

## 2024-06-05 DIAGNOSIS — I10 HYPERTENSION, UNSPECIFIED TYPE: Primary | ICD-10-CM

## 2024-06-05 DIAGNOSIS — R35.0 INCREASED URINARY FREQUENCY: ICD-10-CM

## 2024-06-05 LAB
25(OH)D3 SERPL-MCNC: 44 NG/ML (ref 30–100)
ALBUMIN SERPL BCP-MCNC: 4.2 G/DL (ref 3.4–5)
ALP SERPL-CCNC: 103 U/L (ref 33–136)
ALT SERPL W P-5'-P-CCNC: 18 U/L (ref 7–45)
ANION GAP SERPL CALC-SCNC: 15 MMOL/L (ref 10–20)
AST SERPL W P-5'-P-CCNC: 18 U/L (ref 9–39)
BASOPHILS # BLD AUTO: 0.03 X10*3/UL (ref 0–0.1)
BASOPHILS NFR BLD AUTO: 0.4 %
BILIRUB SERPL-MCNC: 0.4 MG/DL (ref 0–1.2)
BUN SERPL-MCNC: 13 MG/DL (ref 6–23)
CALCIUM SERPL-MCNC: 9.9 MG/DL (ref 8.6–10.3)
CHLORIDE SERPL-SCNC: 102 MMOL/L (ref 98–107)
CO2 SERPL-SCNC: 28 MMOL/L (ref 21–32)
CREAT SERPL-MCNC: 0.95 MG/DL (ref 0.5–1.05)
EGFRCR SERPLBLD CKD-EPI 2021: 64 ML/MIN/1.73M*2
EOSINOPHIL # BLD AUTO: 0.24 X10*3/UL (ref 0–0.4)
EOSINOPHIL NFR BLD AUTO: 3.6 %
ERYTHROCYTE [DISTWIDTH] IN BLOOD BY AUTOMATED COUNT: 14.4 % (ref 11.5–14.5)
EST. AVERAGE GLUCOSE BLD GHB EST-MCNC: 105 MG/DL
FERRITIN SERPL-MCNC: 85 NG/ML (ref 8–150)
FOLATE SERPL-MCNC: 17.4 NG/ML
GLUCOSE SERPL-MCNC: 120 MG/DL (ref 74–99)
HBA1C MFR BLD: 5.3 %
HCT VFR BLD AUTO: 41.5 % (ref 36–46)
HGB BLD-MCNC: 13.1 G/DL (ref 12–16)
IMM GRANULOCYTES # BLD AUTO: 0.03 X10*3/UL (ref 0–0.5)
IMM GRANULOCYTES NFR BLD AUTO: 0.4 % (ref 0–0.9)
IRON SATN MFR SERPL: 21 % (ref 25–45)
IRON SERPL-MCNC: 74 UG/DL (ref 35–150)
LYMPHOCYTES # BLD AUTO: 1.75 X10*3/UL (ref 0.8–3)
LYMPHOCYTES NFR BLD AUTO: 26.1 %
MCH RBC QN AUTO: 28.4 PG (ref 26–34)
MCHC RBC AUTO-ENTMCNC: 31.6 G/DL (ref 32–36)
MCV RBC AUTO: 90 FL (ref 80–100)
MONOCYTES # BLD AUTO: 0.48 X10*3/UL (ref 0.05–0.8)
MONOCYTES NFR BLD AUTO: 7.2 %
NEUTROPHILS # BLD AUTO: 4.17 X10*3/UL (ref 1.6–5.5)
NEUTROPHILS NFR BLD AUTO: 62.3 %
NRBC BLD-RTO: 0 /100 WBCS (ref 0–0)
PLATELET # BLD AUTO: 379 X10*3/UL (ref 150–450)
POTASSIUM SERPL-SCNC: 3.7 MMOL/L (ref 3.5–5.3)
PROT SERPL-MCNC: 7.3 G/DL (ref 6.4–8.2)
RBC # BLD AUTO: 4.61 X10*6/UL (ref 4–5.2)
SODIUM SERPL-SCNC: 141 MMOL/L (ref 136–145)
TIBC SERPL-MCNC: 349 UG/DL (ref 240–445)
UIBC SERPL-MCNC: 275 UG/DL (ref 110–370)
VIT B12 SERPL-MCNC: 399 PG/ML (ref 211–911)
WBC # BLD AUTO: 6.7 X10*3/UL (ref 4.4–11.3)

## 2024-06-05 PROCEDURE — 82746 ASSAY OF FOLIC ACID SERUM: CPT

## 2024-06-05 PROCEDURE — 1123F ACP DISCUSS/DSCN MKR DOCD: CPT | Performed by: STUDENT IN AN ORGANIZED HEALTH CARE EDUCATION/TRAINING PROGRAM

## 2024-06-05 PROCEDURE — 3074F SYST BP LT 130 MM HG: CPT | Performed by: STUDENT IN AN ORGANIZED HEALTH CARE EDUCATION/TRAINING PROGRAM

## 2024-06-05 PROCEDURE — 99214 OFFICE O/P EST MOD 30 MIN: CPT | Performed by: STUDENT IN AN ORGANIZED HEALTH CARE EDUCATION/TRAINING PROGRAM

## 2024-06-05 PROCEDURE — 80053 COMPREHEN METABOLIC PANEL: CPT

## 2024-06-05 PROCEDURE — 83540 ASSAY OF IRON: CPT

## 2024-06-05 PROCEDURE — 82306 VITAMIN D 25 HYDROXY: CPT

## 2024-06-05 PROCEDURE — 82607 VITAMIN B-12: CPT

## 2024-06-05 PROCEDURE — 83550 IRON BINDING TEST: CPT

## 2024-06-05 PROCEDURE — 36415 COLL VENOUS BLD VENIPUNCTURE: CPT

## 2024-06-05 PROCEDURE — 82728 ASSAY OF FERRITIN: CPT

## 2024-06-05 PROCEDURE — G2211 COMPLEX E/M VISIT ADD ON: HCPCS | Performed by: STUDENT IN AN ORGANIZED HEALTH CARE EDUCATION/TRAINING PROGRAM

## 2024-06-05 PROCEDURE — 85025 COMPLETE CBC W/AUTO DIFF WBC: CPT

## 2024-06-05 PROCEDURE — 83036 HEMOGLOBIN GLYCOSYLATED A1C: CPT

## 2024-06-05 PROCEDURE — 3078F DIAST BP <80 MM HG: CPT | Performed by: STUDENT IN AN ORGANIZED HEALTH CARE EDUCATION/TRAINING PROGRAM

## 2024-06-05 PROCEDURE — 1159F MED LIST DOCD IN RCRD: CPT | Performed by: STUDENT IN AN ORGANIZED HEALTH CARE EDUCATION/TRAINING PROGRAM

## 2024-06-05 RX ORDER — HYDROCHLOROTHIAZIDE 25 MG/1
25 TABLET ORAL DAILY
Qty: 30 TABLET | Refills: 0 | Status: SHIPPED | OUTPATIENT
Start: 2024-06-05 | End: 2024-06-28

## 2024-06-05 ASSESSMENT — PATIENT HEALTH QUESTIONNAIRE - PHQ9
2. FEELING DOWN, DEPRESSED OR HOPELESS: NOT AT ALL
SUM OF ALL RESPONSES TO PHQ9 QUESTIONS 1 AND 2: 0
1. LITTLE INTEREST OR PLEASURE IN DOING THINGS: NOT AT ALL

## 2024-06-05 NOTE — PROGRESS NOTES
Subjective   Patient ID: Lacie Mcneal is a 69 y.o. female who presents for Follow-up (PT is here for a 6 month follow up.).    HPI    Afib   S/p DCC 2/2022  S/p PVI 4/2023  Previous medications:  Amiodarone was discontinued  No recurrences of AF since  Maintained on Eliquis without bleeding events  CHADS2-Vasc Scoring     Concerns:  BP  Followed up with cardiology at [facility] who noted that her BP has been elevated and referred back with PCP.   Home BP readings 172/91mmHg, 163/86mmHg, 141/69mmHg, 156/76mmHg    Tramadol added for persistent cough     Persistent weakness   Feels that her body is not going   With walking she isn't able   Tremor noted if she pushes past   Feels off balance when walking     Urinary frequency     Objective   Physical Exam  Vitals reviewed.   Constitutional:       General: She is not in acute distress.     Appearance: She is not ill-appearing.   HENT:      Right Ear: Tympanic membrane and ear canal normal.      Left Ear: Tympanic membrane and ear canal normal.      Mouth/Throat:      Mouth: Mucous membranes are moist.      Pharynx: Oropharynx is clear. No oropharyngeal exudate or posterior oropharyngeal erythema.   Eyes:      Extraocular Movements: Extraocular movements intact.      Conjunctiva/sclera: Conjunctivae normal.      Pupils: Pupils are equal, round, and reactive to light.   Neck:      Vascular: No carotid bruit.   Cardiovascular:      Rate and Rhythm: Normal rate and regular rhythm.      Heart sounds: No murmur heard.     No gallop.   Pulmonary:      Effort: Pulmonary effort is normal.      Breath sounds: Normal breath sounds. No wheezing, rhonchi or rales.   Abdominal:      General: Abdomen is flat. Bowel sounds are normal.      Palpations: Abdomen is soft.      Tenderness: There is no abdominal tenderness.   Musculoskeletal:      Cervical back: Neck supple.      Left lower leg: No edema.   Skin:     General: Skin is warm and dry.      Findings: No rash.   Neurological:       General: No focal deficit present.      Mental Status: She is alert and oriented to person, place, and time.      Gait: Gait normal.   Psychiatric:         Mood and Affect: Mood normal.         Behavior: Behavior normal.         Assessment/Plan   Diagnoses and all orders for this visit:  Hypertension, unspecified type  -     CBC and Auto Differential; Future  -     Comprehensive metabolic panel; Future  Asthma, unspecified asthma severity, unspecified whether complicated, unspecified whether persistent (HHS-HCC)  Obesity, morbid (Multi)  -     Vitamin B12; Future  -     Iron and TIBC; Future  -     Ferritin; Future  -     Folate; Future  -     Hemoglobin A1C; Future  Hypovitaminosis D  -     Vitamin D 25-Hydroxy,Total (for eval of Vitamin D levels); Future  H/O: obesity  -     Iron and TIBC; Future  -     Ferritin; Future  -     Hemoglobin A1C; Future  Increased urinary frequency  -     Referral to Physical Therapy; Future  Postlaminectomy syndrome, lumbar region  -     Referral to Pain Medicine; Future    Lacie Mcneal is a 69 y.o. female presents for HTN follow up.     Prediabetes   A1C 6.1% 2/23     HTN  Controlled   Following with cardiology      AF   s/p DCC 02/2022, s/p ablation 04/2023.   Failed ablation and was started on amiodarone which as since been discontinued   AC with Eliquis 5 BID   echo 6/2021 showed EF 62%, normal atrial size      Adria Vogel MD, ENT   Tramadol for chronic cough      Hip Pain on the right   Radiation forward in the groin and thigh area x 1 week   No trauma, no falls, no change in motion   No urinary changes, no history of nephrolithiasis   Worsening   Sharp, achy pain   9/10 persistently      Chronic Knee Pain   Following with Dr. Liz who is retiring   MR pending   Referral needed for new Ortho   Pt declined PT due to Copay cost      History of back surgery 15 years ago      Obesity     Colonoscopy 2022 with Dr. Jackson  Dexa 2023   Mammogram 2021  echo 6/2021 showed  EF 62%, normal atrial size        Tammy Cruz, DO 06/05/24 9:26 AM

## 2024-06-28 DIAGNOSIS — I10 HYPERTENSION, UNSPECIFIED TYPE: ICD-10-CM

## 2024-06-28 RX ORDER — HYDROCHLOROTHIAZIDE 25 MG/1
25 TABLET ORAL DAILY
Qty: 90 TABLET | Refills: 1 | Status: SHIPPED | OUTPATIENT
Start: 2024-06-28

## 2024-07-08 ENCOUNTER — APPOINTMENT (OUTPATIENT)
Dept: PRIMARY CARE | Facility: CLINIC | Age: 69
End: 2024-07-08
Payer: MEDICARE

## 2024-07-26 ENCOUNTER — TELEPHONE (OUTPATIENT)
Dept: OTOLARYNGOLOGY | Facility: CLINIC | Age: 69
End: 2024-07-26
Payer: MEDICARE

## 2024-07-26 NOTE — TELEPHONE ENCOUNTER
Pt called and requested a refill for tramodol to [facility] in [place] on [address]

## 2024-07-30 NOTE — TELEPHONE ENCOUNTER
Pt called back for refill on tramadol.  I suggested she call her PCP.  She was last in 1/2024 and was told to follow up in 3 months.  Pt just made a follow up for Sept.  She only has 1 week left and goes on vacation on Saturday.  Pt will call back and let us know if PCP called it in.

## 2024-08-01 DIAGNOSIS — R05.3 CHRONIC COUGH: ICD-10-CM

## 2024-08-04 RX ORDER — TRAMADOL HYDROCHLORIDE 50 MG/1
50 TABLET ORAL 2 TIMES DAILY PRN
Qty: 60 TABLET | Refills: 0 | Status: SHIPPED | OUTPATIENT
Start: 2024-08-04

## 2024-09-16 ENCOUNTER — APPOINTMENT (OUTPATIENT)
Dept: OTOLARYNGOLOGY | Facility: CLINIC | Age: 69
End: 2024-09-16
Payer: MEDICARE

## 2024-09-16 ASSESSMENT — ENCOUNTER SYMPTOMS
RESPIRATORY NEGATIVE: 1
MYALGIAS: 1
CONSTITUTIONAL NEGATIVE: 1
CARDIOVASCULAR NEGATIVE: 1
ARTHRALGIAS: 1

## 2024-09-16 NOTE — PATIENT INSTRUCTIONS
May use RICE therapy as needed  Start into hand/physical therapy 1-2 times a week for 8-10 weeks with manual therapy as well as dry needling and IASTM  Additionally reviewed modifying activities to be performed while exercising as well as activities with daily living  Discussed in detail with the patient to the level of their understanding the possibility in the future of regenerative injections versus corticosteroid injections  Recommendation over-the-counter calcium 500mg, 3 times a day with vitamin-D3 0623-9909+ units a day with food as well as a daily multivitamin  Recommendation over-the-counter Move Free for joint health  May take OTC Tylenol Extra Strength or OTC Tylenol Arthritis, taking one every 6-8 hours with food as needed for pain management.  Patient advised regarding the risks and/or potential adverse reactions and/or side effects of any prescribed medications along with any over-the-counter medications or any supplements used. Patient advised to seek immediate medical care if any adverse reactions occur. The patient and/or patient(s) parent(s) verbalized their understanding.  Possibility in future of MRI of RIGHT THUMB/WRIST to rule out tendon vs ligament vs tear vs fracture vs other, MSK to read.   Suggested patient be fitted for and wear a wrist brace with thumb spica  Follow up 3 weeks for re-xray of the right wrist

## 2024-09-16 NOTE — PROGRESS NOTES
New patient  History Of Present Illness  Lacie Mcneal is a 69 y.o. female presenting with RIGHT thumb and wrist pain. 24 she started to experience 5/10 right wrist and thumb pain at rest. She states that a storage crate ate home fell on her wrist. She states that she has a high threshold for pain and this is very troublesome for her. States that she is taking Advil for pain management as well as icing. She has not yet been evaluated for this injury and has no bracing. She is retired and does not need work accommodations. She denies previous history of wrist injury. She denies pops snaps or cracks. She denies numbness tingling pins and needles sensations.  Reviewed patient x-ray results in detail.  Patient appears to have a avulsion fracture of the distal scaphoid.  This correlates with the patient's marked tenderness over the scaphoid and distal radius.  As such we will refer patient to occupational therapy for bracing as we do not have the capacity to brace the patient at this time.  We will also order occupational therapy for patient which she can start as soon as she is able.  Patient can follow-up in 3 weeks for reevaluation and ivette-ray and we can adjust treatment as indicated at that time.  Patient verbalizes understanding and agreement with plan of care.    Past Medical History  She has a past medical history of Allergic, GERD (gastroesophageal reflux disease) (), and Hypertension ().    Surgical History  She has a past surgical history that includes Appendectomy (); Back surgery (); Cholecystectomy ();  section, low transverse (, , ); Cardiac surgery (); Spine surgery (); Tubal ligation (); Seco tooth extraction (); and Hysterectomy.     Social History  She reports that she has never smoked. She has never used smokeless tobacco. She reports current alcohol use. She reports that she does not use drugs.    Family History  Family History  "  Problem Relation Name Age of Onset    Arthritis Mother Jacqueline     Cancer Mother Jacqueline         Lung, tobacco use    Heart disease Mother Jacqueline     Hypertension Mother Jacqueline     Stroke Mother Jacqueline     Cancer Father Zachery         Prostate    Colon cancer Father Zachery      Allergies  Erythromycin, Penicillins, and Sulfa (sulfonamide antibiotics)    Review of Systems  Review of Systems   Constitutional: Negative.    HENT: Negative.     Respiratory: Negative.     Cardiovascular: Negative.    Musculoskeletal:  Positive for arthralgias and myalgias.   All other systems reviewed and are negative.       Last Recorded Vitals  /80   Pulse 80   Ht 1.702 m (5' 7\")   Wt 95.7 kg (211 lb)   BMI 33.05 kg/m²      The , HOWIE KNIGHT was present during today's visit and not limited to physical examination!    Examination:  Right Thumb  Erythema: Negative.   Edema: Negative.   Effusion: Negative.   Warmth: Negative.   Ecchymosis/Bruising: Negative.   Percussion Test: Negative.   Tuning Fork Test: Negative.   Abrasions: Negative.   Orientation: Symmetrical.            ROM:   Positive decreased ROM all fields Painful    Wrist Flexion (80 degrees)  Wrist Extension (70 degrees)  Wrist Supination (90 degrees)  Wrist Pronation (90 degrees)         Wrist Ulnar Deviation (30 degrees)  Wrist Radial Deviation (20 degrees)          Finger MCP (100 degrees)/ PIP (100 degrees)/ DIP (90 degrees) Flexion  Finger MCP (30 degrees) /PIP (0 degrees) / DIP (20 degrees) Extension          Fingers ABduction (20 degrees)  Fingers ADduction (0 degrees)          Thumb & Fingertip Opposition  Thumb Circumduction.            Muscle Strength: Positive   +3/+5 Wrist Flexion  +3/+5 Wrist Extension        +3/+5 Wrist Supination  +3/+5 Wrist Pronation          +3/+5 Wrist Ulnar Deviation  +3/+5 Wrist Radial Deviation         +5/+5 Finger MCP/PIP/DIP Flexion  +5/+5 Finger MCP/PIP/DIP Extension          +5/+5 Finger Abduction  +5/+5 Finger " Adduction          +3/+5 Finger/Thumb Opposition  +3/+5 Thumb Circumduction.            Motor/Neurological:    Normal  Tip of Thumb (Median Nerve)  Tip of 5th Finger (Ulnar Nerve)  Flex and Extend Thumb (Median and Radial Nerve)  Scissor Fingers (Ulnar Nerve)  Raise Thumb Against Resistance on Flat Surface (Median Nerve).          Sensation/Neurological:   Negative, Sensation Intact, 2 Point Discrimination Test Negative         C6: Lateral forearms, thumbs, anterior index finger, lateral half of the middle finger  C7: Some of posterior index finger, medial half of middle finger, upper posterior back, back of arms  C8: Ring finger, little finger, medial forearm, posterior upper back.     Palpation:   Positive Tenderness to Palpation Right Radial Thumb  scaphoid and anatomical snuff box          Vascular:   +2/+4 Radial Pulse  +2/+4 Ulnar Pulse  Capillary Refill < 2 seconds.               Wrist/Hand/Finger - Motor Function:  Debbie-Littler Test: Negative.   Retinacular Test: Negative.   Princh  Test: Positive    Key  Test: Positive   Power  Test: Positive   Que  Test: Negative.         Wrist/Hand/Finger - Nerve Lesions/Compression Neuropathies:  Carpal Tunnel Sign Test: Negative.   Phalen's Sign Test: Negative.   Reverse Phalen's Sign Test: Negative.   Wartenberg Sign Test: Negative.   Tinel's Sign Test: Negative.   Opposition Test: Negative.   Pinch Test: Negative.   Carpal/Digital Compression Test: Negative.   Ochsner Test: Negative.   Froment's Sign Ulnar Nerve Test: Negative.   Intrinsic Ulnar Nerve Test: Negative.   O Test: Positive        Wrist/Hand/Finger -   Valgus Stess Test: . Positive   Varus Stess Test: Positive   [place] Scaphoid Shift Test:Positive    Scapholunate Ballotment Test:. Positive   [place] Test: Negative.   Dorsal Capitate Displacement Apprehension Test: Negative.   Shuck Test: Negative.         Wrist/Hand/Finger - Tenosynovitis:  Flexor Digitorum Profundus Test:  Positive.    Flexor Digitorum Superficialis Test:  Positive.   Flexor Pollicis Longus Test:  Negative.   Flexor Pollicis Brevis Test:  Negative.   Flexor Radial Longus Test:  Positive   Flexor Radial Brevis Test: Positive   Extensor Digitorum Profundus Test:  Negative.   Extensor Digitorum Superficialis Test:  Negative.   Extensor Pollicis Longus Test:  Positive  Extensor Pollicis Brevis (EPB)[Dequervain's): Positive   Extensor Radial Longus Test:  Positive   Extensor Radial Brevis Test:  Positive.   Muckard Test:  Negative.   Finklestein Test:  Negative.   Kanavel Sign:  Negative.   Flexor and Extensor Longus Test:  Negative.         Wrist/Hand/Finger - TFCC:  Supination Lift Test: Negative.   Grind Test: Negative.          Imaging and Diagnostics Review:  Plain radiographs were ordered and independently reviewed.  X-rays independently reviewed distal scaphoid avulsion fracture noted.  Assessment   1. Right wrist pain    2. Wrist sprain, right, initial encounter        Plan   Treatment or Intervention:  May use RICE therapy as needed  Start into hand/physical therapy 1-2 times a week for 8-10 weeks with manual therapy as well as dry needling and IASTM  Additionally reviewed modifying activities to be performed while exercising as well as activities with daily living  Discussed in detail with the patient to the level of their understanding the possibility in the future of regenerative injections versus corticosteroid injections  Recommendation over-the-counter calcium 500mg, 3 times a day with vitamin-D3 9879-8627+ units a day with food as well as a daily multivitamin  Recommendation over-the-counter Move Free for joint health  May take OTC Tylenol Extra Strength or OTC Tylenol Arthritis, taking one every 6-8 hours with food as needed for pain management.  Patient advised regarding the risks and/or potential adverse reactions and/or side effects of any prescribed medications along with any over-the-counter medications or any  supplements used. Patient advised to seek immediate medical care if any adverse reactions occur. The patient and/or patient(s) parent(s) verbalized their understanding.  Possibility in future of MRI of RIGHT THUMB/WRIST to rule out tendon vs ligament vs tear vs fracture vs other, MSK to read.  Follow up for bracing with Occupational Therapy at [facility].     Diagnostic studies:        Activity Instructions, Restrictions, and Accommodations:      Consultations/Referrals:  Occupational therapy    Follow-up:  Follow up 3 weeks  Total appointment time _30_ minutes. Greater than 50% spent counseling patient on results of physical exam, treatment options as well as results of ordered imaging and treatment for results, need for PT and expected outcomes, as well as discussing possible medications.    HOWIE URRUTIA on 9/17/24 at 8:45 AM.     Gokul Ellis CNP

## 2024-09-17 ENCOUNTER — APPOINTMENT (OUTPATIENT)
Dept: OTOLARYNGOLOGY | Facility: CLINIC | Age: 69
End: 2024-09-17
Payer: MEDICARE

## 2024-09-17 ENCOUNTER — HOSPITAL ENCOUNTER (OUTPATIENT)
Dept: RADIOLOGY | Facility: CLINIC | Age: 69
Discharge: HOME | End: 2024-09-17
Payer: MEDICARE

## 2024-09-17 ENCOUNTER — OFFICE VISIT (OUTPATIENT)
Dept: SPORTS MEDICINE | Facility: CLINIC | Age: 69
End: 2024-09-17
Payer: MEDICARE

## 2024-09-17 ENCOUNTER — TREATMENT (OUTPATIENT)
Dept: OCCUPATIONAL THERAPY | Facility: CLINIC | Age: 69
End: 2024-09-17
Payer: MEDICARE

## 2024-09-17 VITALS
WEIGHT: 211 LBS | DIASTOLIC BLOOD PRESSURE: 80 MMHG | BODY MASS INDEX: 33.12 KG/M2 | SYSTOLIC BLOOD PRESSURE: 120 MMHG | HEIGHT: 67 IN | HEART RATE: 80 BPM

## 2024-09-17 VITALS — HEIGHT: 67 IN | BODY MASS INDEX: 32.96 KG/M2 | WEIGHT: 210 LBS

## 2024-09-17 DIAGNOSIS — S63.501A WRIST SPRAIN, RIGHT, INITIAL ENCOUNTER: ICD-10-CM

## 2024-09-17 DIAGNOSIS — S62.034A NONDISPLACED FRACTURE OF PROXIMAL THIRD OF NAVICULAR (SCAPHOID) BONE OF RIGHT WRIST, INITIAL ENCOUNTER FOR CLOSED FRACTURE: ICD-10-CM

## 2024-09-17 DIAGNOSIS — M25.531 RIGHT WRIST PAIN: ICD-10-CM

## 2024-09-17 DIAGNOSIS — R05.3 CHRONIC COUGH: Primary | ICD-10-CM

## 2024-09-17 DIAGNOSIS — S62.034A NONDISPLACED FRACTURE OF PROXIMAL THIRD OF NAVICULAR (SCAPHOID) BONE OF RIGHT WRIST, INITIAL ENCOUNTER FOR CLOSED FRACTURE: Primary | ICD-10-CM

## 2024-09-17 PROCEDURE — 99213 OFFICE O/P EST LOW 20 MIN: CPT | Performed by: NURSE PRACTITIONER

## 2024-09-17 PROCEDURE — 1123F ACP DISCUSS/DSCN MKR DOCD: CPT | Performed by: NURSE PRACTITIONER

## 2024-09-17 PROCEDURE — L3808 WHFO, RIGID W/O JOINTS: HCPCS | Performed by: OCCUPATIONAL THERAPIST

## 2024-09-17 PROCEDURE — 1159F MED LIST DOCD IN RCRD: CPT | Performed by: OTOLARYNGOLOGY

## 2024-09-17 PROCEDURE — 73110 X-RAY EXAM OF WRIST: CPT | Mod: RIGHT SIDE | Performed by: RADIOLOGY

## 2024-09-17 PROCEDURE — 1036F TOBACCO NON-USER: CPT | Performed by: OTOLARYNGOLOGY

## 2024-09-17 PROCEDURE — 1123F ACP DISCUSS/DSCN MKR DOCD: CPT | Performed by: OTOLARYNGOLOGY

## 2024-09-17 PROCEDURE — 1036F TOBACCO NON-USER: CPT | Performed by: NURSE PRACTITIONER

## 2024-09-17 PROCEDURE — 73110 X-RAY EXAM OF WRIST: CPT | Mod: RT

## 2024-09-17 PROCEDURE — 99213 OFFICE O/P EST LOW 20 MIN: CPT | Performed by: OTOLARYNGOLOGY

## 2024-09-17 PROCEDURE — 99203 OFFICE O/P NEW LOW 30 MIN: CPT | Performed by: NURSE PRACTITIONER

## 2024-09-17 PROCEDURE — 1125F AMNT PAIN NOTED PAIN PRSNT: CPT | Performed by: NURSE PRACTITIONER

## 2024-09-17 PROCEDURE — 3008F BODY MASS INDEX DOCD: CPT | Performed by: NURSE PRACTITIONER

## 2024-09-17 PROCEDURE — 3008F BODY MASS INDEX DOCD: CPT | Performed by: OTOLARYNGOLOGY

## 2024-09-17 PROCEDURE — 1160F RVW MEDS BY RX/DR IN RCRD: CPT | Performed by: NURSE PRACTITIONER

## 2024-09-17 PROCEDURE — 1159F MED LIST DOCD IN RCRD: CPT | Performed by: NURSE PRACTITIONER

## 2024-09-17 ASSESSMENT — COLUMBIA-SUICIDE SEVERITY RATING SCALE - C-SSRS
1. IN THE PAST MONTH, HAVE YOU WISHED YOU WERE DEAD OR WISHED YOU COULD GO TO SLEEP AND NOT WAKE UP?: NO
2. HAVE YOU ACTUALLY HAD ANY THOUGHTS OF KILLING YOURSELF?: NO
6. HAVE YOU EVER DONE ANYTHING, STARTED TO DO ANYTHING, OR PREPARED TO DO ANYTHING TO END YOUR LIFE?: NO

## 2024-09-17 ASSESSMENT — ENCOUNTER SYMPTOMS
LOSS OF SENSATION IN FEET: 0
OCCASIONAL FEELINGS OF UNSTEADINESS: 0
DEPRESSION: 0

## 2024-09-17 ASSESSMENT — PAIN SCALES - GENERAL
PAINLEVEL_OUTOF10: 5 - MODERATE PAIN
PAINLEVEL: 5

## 2024-09-17 ASSESSMENT — PAIN DESCRIPTION - DESCRIPTORS: DESCRIPTORS: ACHING;THROBBING;SHOOTING

## 2024-09-17 ASSESSMENT — PAIN - FUNCTIONAL ASSESSMENT: PAIN_FUNCTIONAL_ASSESSMENT: 0-10

## 2024-09-17 NOTE — PROGRESS NOTES
Chief Complaint   Patient presents with    Follow-up     LOV: 2024 F/U COUGH, USING TRAMADOL       Date of Evaluation: 2024   HPI  She returns in follow-up for neurogenic cough.  She is controlled with tramadol 50 mg twice daily.  When she tries to go without she has a prompt return of her cough  Lacie Mcneal is a 69 y.o. female  in follow-up for her several year history of chronic nonproductive cough. Almost immediately with starting tramadol the cough has resolved. She is currently at 50 mg nightly with good cough control. She is on amiodarone and tolerating both well. She underwent a cardiac ablation for her longstanding A-fib and is now under control. She had a bottle of tramadol pills wash down the sink by accident  History: This is a 68-year-old white female with a several year history of a chronic nonproductive cough. She denies any shortness of breath hemoptysis dysphagia odynophagia weight loss or change in her voice. She has seen pulmonary and had a negative chest CT and no benefit from inhalers. She had a GI work-up that was negative. She has tried Zyrtec. No sinus complaints. No shortness of breath       Past Medical History:   Diagnosis Date    Allergic     GERD (gastroesophageal reflux disease)     Hypertension       Past Surgical History:   Procedure Laterality Date    APPENDECTOMY  1973    BACK SURGERY      CARDIAC SURGERY       SECTION, LOW TRANSVERSE  , 1980,     CHOLECYSTECTOMY  2002    HYSTERECTOMY      SPINE SURGERY  2018    TUBAL LIGATION  1982    WISDOM TOOTH EXTRACTION  1974          Medications:   Current Outpatient Medications   Medication Instructions    albuterol 90 mcg/actuation inhaler 2 puffs, inhalation, 4 times daily    apixaban (ELIQUIS) 5 mg, oral, 2 times daily    atorvastatin (LIPITOR) 40 mg, oral    dilTIAZem XR (DILACOR XR) 180 mg, oral, Daily    ergocalciferol (VITAMIN D-2) 50,000 Units, oral, Once Weekly    hydroCHLOROthiazide  "(HYDRODIURIL) 25 mg, oral, Daily    losartan (COZAAR) 100 mg, oral, Daily RT    multivitamin capsule 1 capsule, oral, Daily RT    traMADol (ULTRAM) 50 mg, oral, 2 times daily PRN        Allergies:  Allergies   Allergen Reactions    Erythromycin Other, Hives and Rash    Penicillins Hives, Itching, Rash, Swelling and Unknown    Sulfa (Sulfonamide Antibiotics) Hives, Itching, Rash, Swelling and Unknown        Physical Exam:  Last Recorded Vitals  Height 1.702 m (5' 7\"), weight 95.3 kg (210 lb).  []General appearance: Well-developed, well-nourished in no acute distress, conversant with normal voice quality    Head/face: No erythema or edema or facial tenderness, and normal facial nerve function bilaterally    External ear: Clear external auditory canals with normal pinnae  Tube status: N/A  Middle ear: Tympanic membranes intact and mobile, middle ears normal.  Tympanic membrane perforation: N/A  Mastoid bowl: N/A  Hearing: Normal conversational awareness at normal speech thresholds    Nose visualized using: Anterior rhinoscopy  Nasal dorsum: Nontraumatic midline appearance  Septum: Midline, nonobstructing  Inferior turbinates: Normal, pink  Secretions: Dry    Oral cavity and oropharynx: Normal  Teeth: Good condition  Floor of mouth: without lesions  Palate: Normal hard palate, soft palate and uvula  Oropharynx: Clear, no lesions present  Buccal mucosa: Normal without masses or lesions  Lips: Normal    Nasopharynx: Inadequate mirror exam secondary to gag/anatomy    Larynx and hypopharynx: Mirror examination adequate and revealed epiglottis normal, vocal cord mobility normal, normal mucosa, false vocal cords normal, true vocal cords normal    Neck:  Salivary glands: Normal bilateral parotid and submandibular glands by inspection and palpation.  Non-thyroid masses: No palpable masses or significant lymphadenopathy  Trachea: Midline  Thyroid: No thyromegaly or palpable nodules  Temporomandibular joint: Nontender  Cervical " range of motion: Normal    Heart: Regular rate and rhythm, no murmurs, rubs, gallops  Lungs: Clear to auscultation bilaterally, normal respiratory effort    Neurologic exam: Alert and oriented x3, appropriate affect.  Cranial nerves II-XII normal bilaterally  Extraocular movement: Extraocular movement intact, normal gaze alignment            There are no diagnoses linked to this encounter.       PLAN  Her neurogenic cough is controlled with tramadol.  She will continue to strive towards minimal effective dose.  Recheck in 3 months    Adria Vogel MD

## 2024-09-17 NOTE — PROGRESS NOTES
Rehab Walk-in Note    Patient Name: Lacie Mcneal  MRN: [id number]  Today's Date: 9/17/2024    Referring Physician: Gokul ZAMBRANO    Time In: 9:45  Time Out: 10:05  Splinting Time Entry: 20  Splint Code:     Subjective   Current Problem: R wrist fx  History of Current Problem: Patient reports she was cleaning shelves in her garage when a shelf fell from the wall and hit her in the wrist as she attempted to block it on 9/12/24. Presented to PAOL[facility] Ellis today for consult; per patient xray revealed R wrist fx (no official results available at this time).      Objective   General Visit Information:     Clinical Presentation: Slight edema in R hand/distal FA  Splint Type: Wrist immobilization splint with thumb spica  Treatment order: n/a  Precautions: NWB RUE; splint at all times       Pain: 2/10 R wrist; this is the first day since it happened that I am not in tears        Prior Level of Function: Fully Independent       Assessment/Plan   Therapist fabricated/fitted long opponens splint for patient's RUE.  Patient instructed patient in proper don/doff tech, wear recommendations, importance of skin monitoring, and splint care; written instruction provided. Provided patient stockinette supplies to be worn with splint.   Patient to wear splint at all times as instructed patient physician/therapist.  Plan: PRN

## 2024-10-06 ASSESSMENT — ENCOUNTER SYMPTOMS
CARDIOVASCULAR NEGATIVE: 1
MYALGIAS: 1
CONSTITUTIONAL NEGATIVE: 1
RESPIRATORY NEGATIVE: 1
ARTHRALGIAS: 1

## 2024-10-06 NOTE — PATIENT INSTRUCTIONS
May use RICE therapy as needed  Start into hand/physical therapy 1-2 times a week for 8-10 weeks with manual therapy as well as dry needling and IASTM  Additionally reviewed modifying activities to be performed while exercising as well as activities with daily living  Discussed in detail with the patient to the level of their understanding the possibility in the future of regenerative injections versus corticosteroid injections  Recommendation over-the-counter calcium 500mg, 3 times a day with vitamin-D3 1835-2980+ units a day with food as well as a daily multivitamin  Recommendation over-the-counter Move Free for joint health  May take OTC Tylenol Extra Strength or OTC Tylenol Arthritis, taking one every 6-8 hours with food as needed for pain management.  Patient advised regarding the risks and/or potential adverse reactions and/or side effects of any prescribed medications along with any over-the-counter medications or any supplements used. Patient advised to seek immediate medical care if any adverse reactions occur. The patient and/or patient(s) parent(s) verbalized their understanding.  Possibility in future of MRI of RIGHT THUMB/WRIST to rule out tendon vs ligament vs tear vs fracture vs other, MSK to read.  Follow up as needed

## 2024-10-06 NOTE — PROGRESS NOTES
Established patient  History Of Present Illness  Lacie Mcneal is a 69 y.o. female presenting for her follow up rexray of her RIGHT thumb and wrist. Pt presents wearing brace that she was previously fitted for by occupational therapy at [facility]. Since last visit in office, patient states that she feels improved. Rates current pain as a 0/10. Notes that she has not experienced any increase in pain even when she takes her splint off to shower.  We reviewed patient x-ray results in detail.  Patient x-rays show improvement of the area of sclerosis noted on previous x-ray over the scaphoid.  Patient also denies any pain and states that she is feeling much better and has full range of motion.  As such patient is cleared to transition out of her brace and can continue with her home exercises as previously directed.  Patient can follow-up as needed for further complaints of pain or disability.  Patient verbalizes understanding and agreement with plan of care.    Past Medical History  She has a past medical history of Allergic, GERD (gastroesophageal reflux disease) (), and Hypertension ().    Surgical History  She has a past surgical history that includes Appendectomy (); Back surgery (); Cholecystectomy ();  section, low transverse (, , ); Cardiac surgery (); Spine surgery (); Tubal ligation (); [place] tooth extraction (); and Hysterectomy.     Social History  She reports that she has never smoked. She has never used smokeless tobacco. She reports current alcohol use. She reports that she does not use drugs.    Family History  Family History   Problem Relation Name Age of Onset    Arthritis Mother Jacqueline     Cancer Mother Jacqueline         Lung, tobacco use    Heart disease Mother Jacqueline     Hypertension Mother Jacqueline     Stroke Mother Jacqueline     Cancer Father Zachery         Prostate    Colon cancer Father Zachery      Allergies  Erythromycin, Penicillins, and  "Sulfa (sulfonamide antibiotics)    Review of Systems  Review of Systems   Constitutional: Negative.    HENT: Negative.     Respiratory: Negative.     Cardiovascular: Negative.    Musculoskeletal:  Positive for arthralgias and myalgias.   All other systems reviewed and are negative.    Last Recorded Vitals  /78 (BP Location: Right arm, Patient Position: Sitting, BP Cuff Size: Adult)   Pulse 78   Ht 1.702 m (5' 7\")   Wt 95.3 kg (210 lb)   BMI 32.89 kg/m²      The , RONEL REYNOSO was present during today's visit and not limited to physical examination!    Examination:  Right Thumb  Erythema: Negative.   Edema: Negative.   Effusion: Negative.   Warmth: Negative.   Ecchymosis/Bruising: Negative.   Percussion Test: Negative.   Tuning Fork Test: Negative.   Abrasions: Negative.   Orientation: Symmetrical.            ROM:   Wrist Flexion (80 degrees)  Wrist Extension (70 degrees)  Wrist Supination (90 degrees)  Wrist Pronation (90 degrees)         Wrist Ulnar Deviation (30 degrees)  Wrist Radial Deviation (20 degrees)          Finger MCP (100 degrees)/ PIP (100 degrees)/ DIP (90 degrees) Flexion  Finger MCP (30 degrees) /PIP (0 degrees) / DIP (20 degrees) Extension          Fingers ABduction (20 degrees)  Fingers ADduction (0 degrees)          Thumb & Fingertip Opposition  Thumb Circumduction.            Muscle Strength:  +5/+5 Wrist Flexion  +5/+5 Wrist Extension        +5/+5 Wrist Supination  +5/+5 Wrist Pronation          +5/+5 Wrist Ulnar Deviation  +5/+5 Wrist Radial Deviation         +5/+5 Finger MCP/PIP/DIP Flexion  +5/+5 Finger MCP/PIP/DIP Extension          +5/+5 Finger Abduction  +5/+5 Finger Adduction          +5/+5 Finger/Thumb Opposition  +5/+5 Thumb Circumduction.            Motor/Neurological:    Normal  Tip of Thumb (Median Nerve)  Tip of 5th Finger (Ulnar Nerve)  Flex and Extend Thumb (Median and Radial Nerve)  Scissor Fingers (Ulnar Nerve)  Raise Thumb Against Resistance on Flat Surface " (Median Nerve).          Sensation/Neurological:   Negative, Sensation Intact, 2 Point Discrimination Test Negative         C6: Lateral forearms, thumbs, anterior index finger, lateral half of the middle finger  C7: Some of posterior index finger, medial half of middle finger, upper posterior back, back of arms  C8: Ring finger, little finger, medial forearm, posterior upper back.     Palpation:   Negative.       Vascular:   +2/+4 Radial Pulse  +2/+4 Ulnar Pulse  Capillary Refill < 2 seconds.               Wrist/Hand/Finger - Motor Function:  Garrett-Littler Test: Negative.   Retinacular Test: Negative.   Princh  Test: Negative.   Key  Test: Negative.   Power  Test: Negative.   Que  Test: Negative.         Wrist/Hand/Finger - Nerve Lesions/Compression Neuropathies:  Carpal Tunnel Sign Test: Negative.   Phalen's Sign Test: Negative.   Reverse Phalen's Sign Test: Negative.   Wartenberg Sign Test: Negative.   Tinel's Sign Test: Negative.   Opposition Test: Negative.   Pinch Test: Negative.   Carpal/Digital Compression Test: Negative.   [facility] Test: Negative.   Froment's Sign Ulnar Nerve Test: Negative.   Intrinsic Ulnar Nerve Test: Negative.   O Test: Negative.     Wrist/Hand/Finger -   Valgus Stess Test: . Negative.   Varus Stess Test: Negative.   [place] Scaphoid Shift Test:Negative.   Scapholunate Ballotment Test:. Negative.   [place] Test: Negative.   Dorsal Capitate Displacement Apprehension Test: Negative.   Shuck Test: Negative.         Wrist/Hand/Finger - Tenosynovitis:  Flexor Digitorum Profundus Test:  Negative.   Flexor Digitorum Superficialis Test:  Negative.   Flexor Pollicis Longus Test:  Negative.   Flexor Pollicis Brevis Test:  Negative.   Flexor Radial Longus Test:  Negative.   Flexor Radial Brevis Test: Negative.   Extensor Digitorum Profundus Test:  Negative.   Extensor Digitorum Superficialis Test:  Negative.   Extensor Pollicis Longus Test:  Negative.   Extensor Pollicis Brevis  (EPB)[Dequervain's): Negative.   Extensor Radial Longus Test:  Negative.   Extensor Radial Brevis Test:  Negative.   Muckard Test:  Negative.   Finklestein Test:  Negative.   [place] Sign:  Negative.   Flexor and Extensor Longus Test:  Negative.         Wrist/Hand/Finger - TFCC:  Supination Lift Test: Negative.   Grind Test: Negative.          Imaging and Diagnostics Review:  Plain radiographs were ordered and independently reviewed.      Assessment   1. Nondisplaced fracture of proximal third of navicular (scaphoid) bone of right wrist, subsequent encounter for fracture with routine healing    2. Right wrist sprain, subsequent encounter    3. Right wrist pain        Plan   Treatment or Intervention:  May use RICE therapy as needed  Start into hand/physical therapy 1-2 times a week for 8-10 weeks with manual therapy as well as dry needling and IASTM  Additionally reviewed modifying activities to be performed while exercising as well as activities with daily living  Discussed in detail with the patient to the level of their understanding the possibility in the future of regenerative injections versus corticosteroid injections  Recommendation over-the-counter calcium 500mg, 3 times a day with vitamin-D3 5446-9392+ units a day with food as well as a daily multivitamin  Recommendation over-the-counter Move Free for joint health  May take OTC Tylenol Extra Strength or OTC Tylenol Arthritis, taking one every 6-8 hours with food as needed for pain management.  Patient advised regarding the risks and/or potential adverse reactions and/or side effects of any prescribed medications along with any over-the-counter medications or any supplements used. Patient advised to seek immediate medical care if any adverse reactions occur. The patient and/or patient(s) parent(s) verbalized their understanding.  Possibility in future of MRI of RIGHT THUMB/WRIST to rule out tendon vs ligament vs tear vs fracture vs other, MSK to  read.    Diagnostic studies:  Interpreted By:  Ana Rondon,   STUDY: XR WRIST RIGHT 3+ VIEWS;  9/17/2024 8:48 am      INDICATION:  Signs/Symptoms:right wrist pain..      COMPARISON:  10/28/2018      ACCESSION NUMBER(S):  [id number]      ORDERING CLINICIAN:  RAJI SAHNI      FINDINGS:  Mild narrowing of the 1st carpometacarpal joint is seen with  associated sclerosis and spur formation. Narrowing of the radiocarpal  joint is noted.Degenerative changes are seen at the triscaphe joint.  Soft tissue calcification is seen adjacent to the distal aspect of  the radius and ulna. Remote triquetral and ulnar styloid fractures  are seen.          No acute fracture or dislocation seen. No soft tissue swelling is  identified. There is no evidence for periosteal reaction or bony  erosion. No radiopaque foreign body seen.      IMPRESSION:  Degenerative changes.      No acute fracture or dislocation.      MACRO:  None      Signed by: Ana Rondon 9/18/2024 1:21 PM  Dictation workstation:   [id number]    Activity Instructions, Restrictions, and Accommodations:      Consultations/Referrals:  Occupational therapy    Follow-up:  Follow up as needed  Total appointment time _30_ minutes. Greater than 50% spent counseling patient on results of physical exam, treatment options as well as results of ordered imaging and treatment for results, need for PT and expected outcomes, as well as discussing possible medications.    RAJI SAHNI on 10/8/24 at 10:58 AM.     Raji Sahni CNP

## 2024-10-08 ENCOUNTER — HOSPITAL ENCOUNTER (OUTPATIENT)
Dept: RADIOLOGY | Facility: CLINIC | Age: 69
Discharge: HOME | End: 2024-10-08
Payer: MEDICARE

## 2024-10-08 ENCOUNTER — OFFICE VISIT (OUTPATIENT)
Dept: SPORTS MEDICINE | Facility: CLINIC | Age: 69
End: 2024-10-08
Payer: MEDICARE

## 2024-10-08 VITALS
HEART RATE: 78 BPM | SYSTOLIC BLOOD PRESSURE: 118 MMHG | WEIGHT: 210 LBS | HEIGHT: 67 IN | DIASTOLIC BLOOD PRESSURE: 78 MMHG | BODY MASS INDEX: 32.96 KG/M2

## 2024-10-08 DIAGNOSIS — S62.034D: ICD-10-CM

## 2024-10-08 DIAGNOSIS — M25.531 RIGHT WRIST PAIN: ICD-10-CM

## 2024-10-08 DIAGNOSIS — S63.501D RIGHT WRIST SPRAIN, SUBSEQUENT ENCOUNTER: ICD-10-CM

## 2024-10-08 DIAGNOSIS — S62.034D: Primary | ICD-10-CM

## 2024-10-08 PROCEDURE — 73110 X-RAY EXAM OF WRIST: CPT | Mod: RT

## 2024-10-08 PROCEDURE — 1159F MED LIST DOCD IN RCRD: CPT | Performed by: NURSE PRACTITIONER

## 2024-10-08 PROCEDURE — 1123F ACP DISCUSS/DSCN MKR DOCD: CPT | Performed by: NURSE PRACTITIONER

## 2024-10-08 PROCEDURE — 1126F AMNT PAIN NOTED NONE PRSNT: CPT | Performed by: NURSE PRACTITIONER

## 2024-10-08 PROCEDURE — 1036F TOBACCO NON-USER: CPT | Performed by: NURSE PRACTITIONER

## 2024-10-08 PROCEDURE — 3008F BODY MASS INDEX DOCD: CPT | Performed by: NURSE PRACTITIONER

## 2024-10-08 PROCEDURE — 99214 OFFICE O/P EST MOD 30 MIN: CPT | Performed by: NURSE PRACTITIONER

## 2024-10-08 PROCEDURE — 73110 X-RAY EXAM OF WRIST: CPT | Mod: RIGHT SIDE | Performed by: RADIOLOGY

## 2024-10-08 ASSESSMENT — ENCOUNTER SYMPTOMS
LOSS OF SENSATION IN FEET: 0
OCCASIONAL FEELINGS OF UNSTEADINESS: 0
DEPRESSION: 0

## 2024-10-08 ASSESSMENT — PAIN SCALES - GENERAL: PAINLEVEL: 0-NO PAIN

## 2024-10-08 NOTE — LETTER
October 8, 2024     Patient: Lacie Mcneal   YOB: 1955   Date of Visit: 10/8/2024       To Whom It May Concern:    It is my medical opinion that Lacie Mcneal may return to full duty immediately with no restrictions.    If you have any questions or concerns, please don't hesitate to call.         Sincerely,        Gokul Ellis, APRN-CNP    CC: No Recipients

## 2024-10-20 DIAGNOSIS — R05.3 CHRONIC COUGH: Primary | ICD-10-CM

## 2024-10-21 RX ORDER — TRAMADOL HYDROCHLORIDE 50 MG/1
50 TABLET ORAL 2 TIMES DAILY PRN
Qty: 60 TABLET | Refills: 0 | Status: SHIPPED | OUTPATIENT
Start: 2024-10-21

## 2024-11-23 DIAGNOSIS — R05.3 CHRONIC COUGH: ICD-10-CM

## 2024-11-25 RX ORDER — TRAMADOL HYDROCHLORIDE 50 MG/1
50 TABLET ORAL 2 TIMES DAILY PRN
Qty: 60 TABLET | Refills: 0 | Status: SHIPPED | OUTPATIENT
Start: 2024-11-25

## 2024-12-19 ENCOUNTER — OFFICE VISIT (OUTPATIENT)
Dept: PRIMARY CARE | Facility: CLINIC | Age: 69
End: 2024-12-19
Payer: MEDICARE

## 2024-12-19 VITALS
BODY MASS INDEX: 34.1 KG/M2 | SYSTOLIC BLOOD PRESSURE: 126 MMHG | DIASTOLIC BLOOD PRESSURE: 58 MMHG | HEIGHT: 66 IN | WEIGHT: 212.2 LBS | OXYGEN SATURATION: 97 % | HEART RATE: 71 BPM

## 2024-12-19 DIAGNOSIS — J40 BRONCHITIS: Primary | ICD-10-CM

## 2024-12-19 PROCEDURE — 1036F TOBACCO NON-USER: CPT | Performed by: STUDENT IN AN ORGANIZED HEALTH CARE EDUCATION/TRAINING PROGRAM

## 2024-12-19 PROCEDURE — 99213 OFFICE O/P EST LOW 20 MIN: CPT | Performed by: STUDENT IN AN ORGANIZED HEALTH CARE EDUCATION/TRAINING PROGRAM

## 2024-12-19 PROCEDURE — 1123F ACP DISCUSS/DSCN MKR DOCD: CPT | Performed by: STUDENT IN AN ORGANIZED HEALTH CARE EDUCATION/TRAINING PROGRAM

## 2024-12-19 PROCEDURE — 1159F MED LIST DOCD IN RCRD: CPT | Performed by: STUDENT IN AN ORGANIZED HEALTH CARE EDUCATION/TRAINING PROGRAM

## 2024-12-19 PROCEDURE — 3008F BODY MASS INDEX DOCD: CPT | Performed by: STUDENT IN AN ORGANIZED HEALTH CARE EDUCATION/TRAINING PROGRAM

## 2024-12-19 RX ORDER — AZITHROMYCIN 250 MG/1
TABLET, FILM COATED ORAL
Qty: 6 TABLET | Refills: 0 | Status: SHIPPED | OUTPATIENT
Start: 2024-12-19 | End: 2024-12-24

## 2024-12-19 RX ORDER — BENZONATATE 200 MG/1
200 CAPSULE ORAL 3 TIMES DAILY PRN
Qty: 42 CAPSULE | Refills: 0 | Status: SHIPPED | OUTPATIENT
Start: 2024-12-19 | End: 2025-01-18

## 2024-12-19 ASSESSMENT — ENCOUNTER SYMPTOMS
COUGH: 1
WHEEZING: 1
SORE THROAT: 1
NAUSEA: 0
VOMITING: 0
SINUS PAIN: 0
HEADACHES: 0

## 2024-12-19 NOTE — PROGRESS NOTES
"Subjective   Patient ID: Lacie Mcneal is a 69 y.o. female who presents for URI (Pt is here for a cough and congestion, scratchy throat when cough. Pt tested for Covid on Sunday. Symptoms started on Saturday.).  URI   This is a new problem. The current episode started in the past 7 days. The problem has been gradually worsening. There has been no fever. Associated symptoms include congestion, coughing, a sore throat and wheezing. Pertinent negatives include no headaches, nausea, sinus pain or vomiting. She has tried decongestant for the symptoms. The treatment provided no relief.       Current Outpatient Medications:     albuterol 90 mcg/actuation inhaler, Inhale 2 puffs 4 times a day., Disp: , Rfl:     apixaban (Eliquis) 5 mg tablet, Take 1 tablet (5 mg) by mouth twice a day., Disp: , Rfl:     atorvastatin (Lipitor) 40 mg tablet, Take 1 tablet (40 mg) by mouth., Disp: , Rfl:     dilTIAZem XR (Dilacor XR) 180 mg 24 hr capsule, Take 1 capsule (180 mg) by mouth once daily., Disp: , Rfl:     hydroCHLOROthiazide (HYDRODiuril) 25 mg tablet, TAKE 1 TABLET BY MOUTH EVERY DAY, Disp: 90 tablet, Rfl: 1    multivitamin capsule, Take 1 capsule by mouth once daily., Disp: , Rfl:     traMADol (Ultram) 50 mg tablet, TAKE 1 TABLET (50 MG) BY MOUTH 2 TIMES A DAY AS NEEDED (COUGH)., Disp: 60 tablet, Rfl: 0    ergocalciferol (Vitamin D-2) 1.25 MG (90895 UT) capsule, TAKE 1 CAPSULE (20583 UNITS) BY MOUTH ONE TIME PER WEEK (Patient not taking: Reported on 12/19/2024), Disp: 12 capsule, Rfl: 1    losartan (Cozaar) 100 mg tablet, Take 1 tablet (100 mg) by mouth once daily., Disp: , Rfl:       Visit Vitals  /58   Pulse 71   Ht 1.676 m (5' 6\")   Wt 96.3 kg (212 lb 3.2 oz)   SpO2 97%   BMI 34.25 kg/m²   OB Status Postmenopausal   Smoking Status Never   BSA 2.12 m²         Objective   Physical Exam  Vitals reviewed.   Constitutional:       Appearance: Normal appearance.   Cardiovascular:      Rate and Rhythm: Normal rate and " regular rhythm.      Heart sounds: No murmur heard.  Pulmonary:      Effort: Pulmonary effort is normal. No respiratory distress.      Breath sounds: Normal breath sounds. No wheezing.   Musculoskeletal:      Cervical back: Neck supple.      Left lower leg: No edema.   Neurological:      Mental Status: She is alert.         Assessment/Plan   Diagnoses and all orders for this visit:  Bronchitis  -     benzonatate (Tessalon) 200 mg capsule; Take 1 capsule (200 mg) by mouth 3 times a day as needed for cough. Do not crush or chew.  -     azithromycin (Zithromax) 250 mg tablet; Take 2 tablets (500 mg) by mouth once daily for 1 day, THEN 1 tablet (250 mg) once daily for 4 days. Take 2 tabs (500 mg) by mouth today, than 1 daily for 4 days..           Tammy Cruz DO 12/19/24 11:50 AM

## 2024-12-26 DIAGNOSIS — R05.3 CHRONIC COUGH: Primary | ICD-10-CM

## 2024-12-27 RX ORDER — TRAMADOL HYDROCHLORIDE 50 MG/1
50 TABLET ORAL 2 TIMES DAILY PRN
Qty: 60 TABLET | Refills: 0 | Status: SHIPPED | OUTPATIENT
Start: 2024-12-27

## 2025-01-06 ENCOUNTER — TELEPHONE (OUTPATIENT)
Dept: PRIMARY CARE | Facility: CLINIC | Age: 70
End: 2025-01-06
Payer: MEDICARE

## 2025-01-16 DIAGNOSIS — Z12.31 SCREENING MAMMOGRAM FOR BREAST CANCER: Primary | ICD-10-CM

## 2025-01-21 ENCOUNTER — OFFICE VISIT (OUTPATIENT)
Dept: PRIMARY CARE | Facility: CLINIC | Age: 70
End: 2025-01-21
Payer: MEDICARE

## 2025-01-21 VITALS
HEART RATE: 100 BPM | BODY MASS INDEX: 32.33 KG/M2 | HEIGHT: 67 IN | OXYGEN SATURATION: 95 % | WEIGHT: 206 LBS | TEMPERATURE: 98.8 F | DIASTOLIC BLOOD PRESSURE: 77 MMHG | SYSTOLIC BLOOD PRESSURE: 126 MMHG

## 2025-01-21 DIAGNOSIS — R05.1 ACUTE COUGH: ICD-10-CM

## 2025-01-21 DIAGNOSIS — J01.10 ACUTE NON-RECURRENT FRONTAL SINUSITIS: Primary | ICD-10-CM

## 2025-01-21 PROCEDURE — 1036F TOBACCO NON-USER: CPT

## 2025-01-21 PROCEDURE — 1123F ACP DISCUSS/DSCN MKR DOCD: CPT

## 2025-01-21 PROCEDURE — 3008F BODY MASS INDEX DOCD: CPT

## 2025-01-21 PROCEDURE — 1159F MED LIST DOCD IN RCRD: CPT

## 2025-01-21 PROCEDURE — 87637 SARSCOV2&INF A&B&RSV AMP PRB: CPT

## 2025-01-21 PROCEDURE — 99213 OFFICE O/P EST LOW 20 MIN: CPT

## 2025-01-21 RX ORDER — AZITHROMYCIN 500 MG/1
500 TABLET, FILM COATED ORAL DAILY
Qty: 7 TABLET | Refills: 0 | Status: SHIPPED | OUTPATIENT
Start: 2025-01-21 | End: 2025-01-28

## 2025-01-21 RX ORDER — BENZONATATE 200 MG/1
200 CAPSULE ORAL 3 TIMES DAILY PRN
Qty: 42 CAPSULE | Refills: 0 | Status: SHIPPED | OUTPATIENT
Start: 2025-01-21 | End: 2025-02-20

## 2025-01-21 ASSESSMENT — ENCOUNTER SYMPTOMS
HEADACHES: 1
SORE THROAT: 1
COUGH: 1
SINUS PAIN: 1

## 2025-01-21 NOTE — PROGRESS NOTES
"Subjective   Patient ID: Lacie Mcneal is a 69 y.o. female who presents for Sick Visit (Lacie is here today for cough, both ear pain, chills, fever fluctuating, sore throat and severe headache x 3  days).    URI   This is a new problem. The current episode started in the past 7 days. The problem has been gradually worsening. The maximum temperature recorded prior to her arrival was 101 - 101.9 F. The fever has been present for 1 to 2 days. Associated symptoms include congestion, coughing, ear pain, headaches, joint pain, sinus pain and a sore throat. She has tried NSAIDs and decongestant for the symptoms. The treatment provided no relief.      Pt in today with cough, ear pain, severe headache, and sore throat. Started  sat. Severe facial pain.  Chills so bad could not get warm. Then ran fever. Taking advil and muccinex. Not bringing up any thing. Feels there is thick mucus that is stuck. Took a covid test which was negative. Denies shortness of breath.  Review of Systems   HENT:  Positive for congestion, ear pain, sinus pain and sore throat.    Respiratory:  Positive for cough.    Musculoskeletal:  Positive for joint pain.   Neurological:  Positive for headaches.       Objective   /77   Pulse 100   Temp 37.1 °C (98.8 °F)   Ht 1.702 m (5' 7\")   Wt 93.4 kg (206 lb)   SpO2 95%   BMI 32.26 kg/m²     Physical Exam  Vitals reviewed.   Constitutional:       Appearance: Normal appearance.   HENT:      Right Ear: Tympanic membrane is erythematous.      Left Ear: Tympanic membrane is erythematous.      Nose: Congestion and rhinorrhea present.   Cardiovascular:      Rate and Rhythm: Normal rate and regular rhythm.      Pulses: Normal pulses.      Heart sounds: Normal heart sounds.   Pulmonary:      Effort: Pulmonary effort is normal.      Breath sounds: Normal breath sounds.   Neurological:      General: No focal deficit present.      Mental Status: She is alert and oriented to person, place, and time. "   Psychiatric:         Mood and Affect: Mood normal.         Behavior: Behavior normal.         Assessment/Plan   Diagnoses and all orders for this visit:  Acute non-recurrent frontal sinusitis  Comments:  azithromycin (Zithromax) 500 mg tablet  Orders:  -     azithromycin (Zithromax) 500 mg tablet; Take 1 tablet (500 mg) by mouth once daily for 7 days.  Acute cough  Comments:  benzonatate (Tessalon) 200 mg capsule  Orders:  -     benzonatate (Tessalon) 200 mg capsule; Take 1 capsule (200 mg) by mouth 3 times a day as needed for cough. Do not crush or chew.  -     Sars-CoV-2 and Influenza A/B PCR  -     RSV PCR

## 2025-01-22 LAB
FLUAV RNA RESP QL NAA+PROBE: DETECTED
FLUBV RNA RESP QL NAA+PROBE: NOT DETECTED
RSV RNA RESP QL NAA+PROBE: NOT DETECTED
SARS-COV-2 RNA RESP QL NAA+PROBE: NOT DETECTED

## 2025-01-27 DIAGNOSIS — J06.9 UPPER RESPIRATORY TRACT INFECTION, UNSPECIFIED TYPE: Primary | ICD-10-CM

## 2025-01-27 RX ORDER — METHYLPREDNISOLONE 4 MG/1
TABLET ORAL
Qty: 21 TABLET | Refills: 0 | Status: SHIPPED | OUTPATIENT
Start: 2025-01-27 | End: 2025-02-02

## 2025-02-05 ENCOUNTER — HOSPITAL ENCOUNTER (OUTPATIENT)
Dept: RADIOLOGY | Facility: CLINIC | Age: 70
Discharge: HOME | End: 2025-02-05
Payer: MEDICARE

## 2025-02-05 VITALS — WEIGHT: 206 LBS | HEIGHT: 67 IN | BODY MASS INDEX: 32.33 KG/M2

## 2025-02-05 DIAGNOSIS — Z12.31 SCREENING MAMMOGRAM FOR BREAST CANCER: ICD-10-CM

## 2025-02-05 PROCEDURE — 77063 BREAST TOMOSYNTHESIS BI: CPT

## 2025-02-05 PROCEDURE — 77067 SCR MAMMO BI INCL CAD: CPT | Performed by: STUDENT IN AN ORGANIZED HEALTH CARE EDUCATION/TRAINING PROGRAM

## 2025-02-05 PROCEDURE — 77063 BREAST TOMOSYNTHESIS BI: CPT | Performed by: STUDENT IN AN ORGANIZED HEALTH CARE EDUCATION/TRAINING PROGRAM

## 2025-02-17 DIAGNOSIS — R05.3 CHRONIC COUGH: Primary | ICD-10-CM

## 2025-02-17 RX ORDER — TRAMADOL HYDROCHLORIDE 50 MG/1
50 TABLET ORAL 2 TIMES DAILY PRN
Qty: 60 TABLET | Refills: 0 | Status: SHIPPED | OUTPATIENT
Start: 2025-02-17

## 2025-03-17 DIAGNOSIS — R05.3 CHRONIC COUGH: ICD-10-CM

## 2025-03-17 RX ORDER — TRAMADOL HYDROCHLORIDE 50 MG/1
50 TABLET ORAL 2 TIMES DAILY PRN
Qty: 60 TABLET | Refills: 1 | Status: SHIPPED | OUTPATIENT
Start: 2025-03-17

## 2025-03-17 NOTE — TELEPHONE ENCOUNTER
LOV 9/2024 for neurogenic cough, was to fu in 3 months. Last refill sent 2/17/2025. I set up for a 30 day supply with 1 refill if you agree. Will make her aware she needs a fu appt.

## 2025-03-24 ENCOUNTER — HOSPITAL ENCOUNTER (OUTPATIENT)
Dept: RADIOLOGY | Facility: HOSPITAL | Age: 70
Discharge: HOME | End: 2025-03-24
Payer: MEDICARE

## 2025-03-24 DIAGNOSIS — R10.11 RIGHT UPPER QUADRANT PAIN: ICD-10-CM

## 2025-03-24 PROCEDURE — 74176 CT ABD & PELVIS W/O CONTRAST: CPT | Performed by: RADIOLOGY

## 2025-03-24 PROCEDURE — 74176 CT ABD & PELVIS W/O CONTRAST: CPT

## 2025-04-21 ENCOUNTER — APPOINTMENT (OUTPATIENT)
Dept: OTOLARYNGOLOGY | Facility: CLINIC | Age: 70
End: 2025-04-21
Payer: MEDICARE

## 2025-04-21 VITALS — WEIGHT: 199 LBS | TEMPERATURE: 96.4 F | BODY MASS INDEX: 31.23 KG/M2 | HEIGHT: 67 IN

## 2025-04-21 DIAGNOSIS — R05.3 CHRONIC COUGH: Primary | ICD-10-CM

## 2025-04-21 PROCEDURE — 1036F TOBACCO NON-USER: CPT | Performed by: OTOLARYNGOLOGY

## 2025-04-21 PROCEDURE — 3008F BODY MASS INDEX DOCD: CPT | Performed by: OTOLARYNGOLOGY

## 2025-04-21 PROCEDURE — 1123F ACP DISCUSS/DSCN MKR DOCD: CPT | Performed by: OTOLARYNGOLOGY

## 2025-04-21 PROCEDURE — 1159F MED LIST DOCD IN RCRD: CPT | Performed by: OTOLARYNGOLOGY

## 2025-04-21 PROCEDURE — 99214 OFFICE O/P EST MOD 30 MIN: CPT | Performed by: OTOLARYNGOLOGY

## 2025-04-21 RX ORDER — TRAMADOL HYDROCHLORIDE 50 MG/1
50 TABLET ORAL 2 TIMES DAILY PRN
Qty: 60 TABLET | Refills: 1 | Status: SHIPPED | OUTPATIENT
Start: 2025-04-21

## 2025-04-21 NOTE — PROGRESS NOTES
Chief Complaint   Patient presents with    Follow-up     LOV  F/U NEUROGENIC COUGH, IF SHE DOES NOT TAKE THE TRAMADOL ONCE A DAY, HE COUGH COMES BACK      Date of Evaluation: 2025   HPI  She returns in follow-up for neurogenic cough.  She is controlled with tramadol 50 mg daily.  When she tries to go without she has a prompt return of her cough  Lacie Mcneal is a 70 y.o. female  in follow-up for her several year history of chronic nonproductive cough. Almost immediately with starting tramadol the cough has resolved. She is currently at 50 mg nightly with good cough control. She is on amiodarone and tolerating both well. She underwent a cardiac ablation for her longstanding A-fib and is now under control. She had a bottle of tramadol pills wash down the sink by accident  History: This is a 68-year-old white female with a several year history of a chronic nonproductive cough. She denies any shortness of breath hemoptysis dysphagia odynophagia weight loss or change in her voice. She has seen pulmonary and had a negative chest CT and no benefit from inhalers. She had a GI work-up that was negative. She has tried Zyrtec. No sinus complaints. No shortness of breath       Past Medical History:   Diagnosis Date    Allergic     GERD (gastroesophageal reflux disease)     Hypertension       Past Surgical History:   Procedure Laterality Date    APPENDECTOMY  1973    BACK SURGERY  2018    CARDIAC SURGERY       SECTION, LOW TRANSVERSE  , ,     CHOLECYSTECTOMY      HYSTERECTOMY      OOPHORECTOMY  [id number]    SPINE SURGERY  2018    TUBAL LIGATION  1982    WISDOM TOOTH EXTRACTION  1974          Medications:   Current Outpatient Medications   Medication Instructions    albuterol 90 mcg/actuation inhaler 2 puffs, 4 times daily    apixaban (ELIQUIS) 5 mg, 2 times daily    atorvastatin (LIPITOR) 40 mg    dilTIAZem XR (DILACOR XR) 180 mg, Daily    ergocalciferol (VITAMIN D-2) 50,000  "Units, oral, Once Weekly    hydroCHLOROthiazide (HYDRODIURIL) 25 mg, oral, Daily    losartan (COZAAR) 100 mg, oral, Daily RT    multivitamin capsule 1 capsule, Daily RT    traMADol (ULTRAM) 50 mg, oral, 2 times daily PRN        Allergies:  Allergies   Allergen Reactions    Erythromycin Other, Hives and Rash    Penicillins Hives, Itching, Rash, Swelling and Unknown    Sulfa (Sulfonamide Antibiotics) Hives, Itching, Rash, Swelling and Unknown        Physical Exam:  Last Recorded Vitals  Temperature 35.8 °C (96.4 °F), height 1.702 m (5' 7\"), weight 90.3 kg (199 lb).  []General appearance: Well-developed, well-nourished in no acute distress, conversant with normal voice quality    Head/face: No erythema or edema or facial tenderness, and normal facial nerve function bilaterally    External ear: Clear external auditory canals with normal pinnae  Tube status: N/A  Middle ear: Tympanic membranes intact and mobile, middle ears normal.  Tympanic membrane perforation: N/A  Mastoid bowl: N/A  Hearing: Normal conversational awareness at normal speech thresholds    Nose visualized using: Anterior rhinoscopy  Nasal dorsum: Nontraumatic midline appearance  Septum: Midline, nonobstructing  Inferior turbinates: Normal, pink  Secretions: Dry    Oral cavity and oropharynx: Normal  Teeth: Good condition  Floor of mouth: without lesions  Palate: Normal hard palate, soft palate and uvula  Oropharynx: Clear, no lesions present  Buccal mucosa: Normal without masses or lesions  Lips: Normal    Nasopharynx: Inadequate mirror exam secondary to gag/anatomy    Larynx and hypopharynx: Mirror examination adequate and revealed epiglottis normal, vocal cord mobility normal, normal mucosa, false vocal cords normal, true vocal cords normal    Neck:  Salivary glands: Normal bilateral parotid and submandibular glands by inspection and palpation.  Non-thyroid masses: No palpable masses or significant lymphadenopathy  Trachea: Midline  Thyroid: No " thyromegaly or palpable nodules  Temporomandibular joint: Nontender  Cervical range of motion: Normal    Heart: Regular rate and rhythm, no murmurs, rubs, gallops  Lungs: Clear to auscultation bilaterally, normal respiratory effort    Neurologic exam: Alert and oriented x3, appropriate affect.  Cranial nerves II-XII normal bilaterally  Extraocular movement: Extraocular movement intact, normal gaze alignment            Lacie was seen today for follow-up.  Diagnoses and all orders for this visit:  Chronic cough (Primary)         PLAN  Her neurogenic cough is controlled with tramadol.  She will continue to strive towards minimal effective dose.  Recheck in 4 months    Adria Vogel MD

## 2025-05-15 ENCOUNTER — APPOINTMENT (OUTPATIENT)
Dept: PRIMARY CARE | Facility: CLINIC | Age: 70
End: 2025-05-15
Payer: MEDICARE

## 2025-06-05 ENCOUNTER — APPOINTMENT (OUTPATIENT)
Dept: PRIMARY CARE | Facility: CLINIC | Age: 70
End: 2025-06-05
Payer: MEDICARE

## 2025-06-05 ENCOUNTER — TELEPHONE (OUTPATIENT)
Dept: PRIMARY CARE | Facility: CLINIC | Age: 70
End: 2025-06-05

## 2025-06-05 VITALS
WEIGHT: 197 LBS | HEIGHT: 67 IN | TEMPERATURE: 97.7 F | BODY MASS INDEX: 30.92 KG/M2 | SYSTOLIC BLOOD PRESSURE: 120 MMHG | DIASTOLIC BLOOD PRESSURE: 62 MMHG | HEART RATE: 64 BPM | OXYGEN SATURATION: 98 %

## 2025-06-05 DIAGNOSIS — Z00.00 ROUTINE GENERAL MEDICAL EXAMINATION AT HEALTH CARE FACILITY: Primary | ICD-10-CM

## 2025-06-05 DIAGNOSIS — I48.0 PAF (PAROXYSMAL ATRIAL FIBRILLATION) (MULTI): ICD-10-CM

## 2025-06-05 DIAGNOSIS — I10 HYPERTENSION, UNSPECIFIED TYPE: ICD-10-CM

## 2025-06-05 DIAGNOSIS — R73.03 PREDIABETES: ICD-10-CM

## 2025-06-05 DIAGNOSIS — R94.6 ABNORMAL THYROID FUNCTION TEST: ICD-10-CM

## 2025-06-05 DIAGNOSIS — E78.5 DYSLIPIDEMIA: ICD-10-CM

## 2025-06-05 DIAGNOSIS — E66.01 OBESITY, MORBID (MULTI): ICD-10-CM

## 2025-06-05 PROBLEM — F41.0 GENERALIZED ANXIETY DISORDER WITH PANIC ATTACKS: Status: RESOLVED | Noted: 2019-06-20 | Resolved: 2025-06-05

## 2025-06-05 PROBLEM — J45.20 MILD INTERMITTENT ASTHMA: Status: ACTIVE | Noted: 2019-06-20

## 2025-06-05 PROBLEM — G56.02 CARPAL TUNNEL SYNDROME OF LEFT WRIST: Status: RESOLVED | Noted: 2020-09-04 | Resolved: 2025-06-05

## 2025-06-05 PROBLEM — M47.817 LUMBOSACRAL SPONDYLOSIS WITHOUT MYELOPATHY: Status: RESOLVED | Noted: 2017-01-16 | Resolved: 2025-06-05

## 2025-06-05 PROBLEM — E78.00 HYPERCHOLESTEREMIA: Status: RESOLVED | Noted: 2018-06-18 | Resolved: 2025-06-05

## 2025-06-05 PROBLEM — F43.22 ADJUSTMENT DISORDER WITH ANXIOUS MOOD: Status: RESOLVED | Noted: 2018-09-17 | Resolved: 2025-06-05

## 2025-06-05 PROBLEM — F41.1 ANXIETY STATE: Status: RESOLVED | Noted: 2019-04-03 | Resolved: 2025-06-05

## 2025-06-05 PROBLEM — M17.0 PRIMARY OSTEOARTHRITIS OF BOTH KNEES: Status: RESOLVED | Noted: 2020-08-06 | Resolved: 2025-06-05

## 2025-06-05 PROBLEM — H26.9 CATARACT: Status: RESOLVED | Noted: 2020-08-06 | Resolved: 2025-06-05

## 2025-06-05 PROBLEM — R07.81 RIB PAIN: Status: RESOLVED | Noted: 2021-04-14 | Resolved: 2025-06-05

## 2025-06-05 PROBLEM — N95.0 POST-MENOPAUSAL BLEEDING: Status: RESOLVED | Noted: 2025-06-05 | Resolved: 2025-06-05

## 2025-06-05 PROBLEM — E78.00 HYPERCHOLESTEROLEMIA: Status: RESOLVED | Noted: 2020-08-06 | Resolved: 2025-06-05

## 2025-06-05 PROBLEM — N95.1 MENOPAUSAL SYMPTOM: Status: RESOLVED | Noted: 2025-06-05 | Resolved: 2025-06-05

## 2025-06-05 PROBLEM — R73.01 IMPAIRED FASTING GLUCOSE: Status: RESOLVED | Noted: 2018-06-18 | Resolved: 2025-06-05

## 2025-06-05 PROBLEM — F41.1 GENERALIZED ANXIETY DISORDER WITH PANIC ATTACKS: Status: RESOLVED | Noted: 2019-06-20 | Resolved: 2025-06-05

## 2025-06-05 PROBLEM — R31.9 HEMATURIA: Status: RESOLVED | Noted: 2021-11-11 | Resolved: 2025-06-05

## 2025-06-05 PROBLEM — Z86.79 HISTORY OF HYPERTENSION: Status: RESOLVED | Noted: 2025-06-05 | Resolved: 2025-06-05

## 2025-06-05 PROBLEM — R31.29 MICROSCOPIC HEMATURIA: Status: RESOLVED | Noted: 2021-09-14 | Resolved: 2025-06-05

## 2025-06-05 PROBLEM — M54.50 LOW BACK PAIN: Status: RESOLVED | Noted: 2018-06-18 | Resolved: 2025-06-05

## 2025-06-05 PROBLEM — R07.89 MUSCULOSKELETAL CHEST PAIN: Status: RESOLVED | Noted: 2025-06-05 | Resolved: 2025-06-05

## 2025-06-05 PROBLEM — Z20.822 EXPOSURE TO SEVERE ACUTE RESPIRATORY SYNDROME CORONAVIRUS 2 (SARS-COV-2): Status: RESOLVED | Noted: 2021-08-04 | Resolved: 2025-06-05

## 2025-06-05 PROBLEM — K21.00 GERD WITH ESOPHAGITIS: Status: RESOLVED | Noted: 2018-10-22 | Resolved: 2025-06-05

## 2025-06-05 PROBLEM — R06.02 SOB (SHORTNESS OF BREATH): Status: RESOLVED | Noted: 2019-10-04 | Resolved: 2025-06-05

## 2025-06-05 PROBLEM — K21.00 GASTROESOPHAGEAL REFLUX DISEASE WITH ESOPHAGITIS: Status: RESOLVED | Noted: 2020-08-06 | Resolved: 2025-06-05

## 2025-06-05 PROBLEM — I49.9 IRREGULAR HEART RATE: Status: RESOLVED | Noted: 2021-04-14 | Resolved: 2025-06-05

## 2025-06-05 PROBLEM — G25.2 INTENTION TREMOR: Status: RESOLVED | Noted: 2021-09-14 | Resolved: 2025-06-05

## 2025-06-05 PROCEDURE — 1036F TOBACCO NON-USER: CPT | Performed by: STUDENT IN AN ORGANIZED HEALTH CARE EDUCATION/TRAINING PROGRAM

## 2025-06-05 PROCEDURE — 1159F MED LIST DOCD IN RCRD: CPT | Performed by: STUDENT IN AN ORGANIZED HEALTH CARE EDUCATION/TRAINING PROGRAM

## 2025-06-05 PROCEDURE — 3074F SYST BP LT 130 MM HG: CPT | Performed by: STUDENT IN AN ORGANIZED HEALTH CARE EDUCATION/TRAINING PROGRAM

## 2025-06-05 PROCEDURE — 3078F DIAST BP <80 MM HG: CPT | Performed by: STUDENT IN AN ORGANIZED HEALTH CARE EDUCATION/TRAINING PROGRAM

## 2025-06-05 PROCEDURE — 1126F AMNT PAIN NOTED NONE PRSNT: CPT | Performed by: STUDENT IN AN ORGANIZED HEALTH CARE EDUCATION/TRAINING PROGRAM

## 2025-06-05 PROCEDURE — 1160F RVW MEDS BY RX/DR IN RCRD: CPT | Performed by: STUDENT IN AN ORGANIZED HEALTH CARE EDUCATION/TRAINING PROGRAM

## 2025-06-05 PROCEDURE — 3008F BODY MASS INDEX DOCD: CPT | Performed by: STUDENT IN AN ORGANIZED HEALTH CARE EDUCATION/TRAINING PROGRAM

## 2025-06-05 PROCEDURE — G0439 PPPS, SUBSEQ VISIT: HCPCS | Performed by: STUDENT IN AN ORGANIZED HEALTH CARE EDUCATION/TRAINING PROGRAM

## 2025-06-05 PROCEDURE — 1170F FXNL STATUS ASSESSED: CPT | Performed by: STUDENT IN AN ORGANIZED HEALTH CARE EDUCATION/TRAINING PROGRAM

## 2025-06-05 ASSESSMENT — ENCOUNTER SYMPTOMS
ABDOMINAL PAIN: 0
PALPITATIONS: 0
CONSTIPATION: 0
WEAKNESS: 0
FEVER: 0
HEADACHES: 0
COUGH: 0
SHORTNESS OF BREATH: 0
DIARRHEA: 0
LIGHT-HEADEDNESS: 0
WHEEZING: 0
CHILLS: 0
DEPRESSION: 0
CHEST TIGHTNESS: 0
OCCASIONAL FEELINGS OF UNSTEADINESS: 0
DIZZINESS: 0
TROUBLE SWALLOWING: 0
BLOOD IN STOOL: 0
LOSS OF SENSATION IN FEET: 0
DIFFICULTY URINATING: 0

## 2025-06-05 ASSESSMENT — ACTIVITIES OF DAILY LIVING (ADL)
TAKING_MEDICATION: INDEPENDENT
GROCERY_SHOPPING: INDEPENDENT
DRESSING: INDEPENDENT
MANAGING_FINANCES: INDEPENDENT
BATHING: INDEPENDENT
DOING_HOUSEWORK: INDEPENDENT

## 2025-06-05 ASSESSMENT — PAIN SCALES - GENERAL: PAINLEVEL_OUTOF10: 0-NO PAIN

## 2025-06-05 NOTE — PATIENT INSTRUCTIONS
Your orders for blood work have been placed.  If you would like to schedule an appointment for your labs, you can do so on-line at [employer] [facility]/patient or call [phone].  They do take walk-in patients also if you prefer.

## 2025-06-05 NOTE — PROGRESS NOTES
Chief Complaint   Patient presents with    Medicare Annual Wellness Visit Subsequent         Subjective   Reason for Visit: Lacie Mcneal is an 70 y.o. female here for a Medicare Wellness visit. She is a prior pt at [facility] pt of Dr. Cain then Dr. Davis, transferred to different PCP, now returning to practice.     Past Medical, Surgical, and Family History reviewed and updated in chart.    Reviewed all medications by prescribing practitioner or clinical pharmacist (such as prescriptions, OTCs, herbal therapies and supplements) and documented in the medical record.      INTERVAL HX/CURRENT CONCERNS: none    CHRONIC CONDITIONS:  Atrial fibrillation - s/p ablation. Under cardiology at [facility]. On eliquis and diltiazem  HTN - losartan 100 mg, hydrochlorothiazide 25 mg  HLD - atorvastatin 40 mg  Asthma - rarely uses prn FARNAZ    Works part time at [employer] 3 hrs 5d weekly  No exercise  Diet mostly healthy     Health Maintenance  Immunizations:     Tdap - 2017    Pneumococcal - UTD    Shingles - never, considering    COVID - no plans for updated vaccine    Influenza - does not get     Colonoscopy: Under Dr. Reyes, says couple years ago, will get report   Lung cancer screening:    [facility]  Was under gyn Dr. Vogel   Postmenopausal: yes  -no BMP  HRT use: no  Last Pap: 2019. Discontinued for age by gyn  History of abnormal pap: no  Last mammogram:  2/2025  Bone Density: 2/2023 normal   -Calcium intake adequate. Vitamin D intake at 800-1000 IU/day.     Patient Care Team:  Margaux Mcgarry MD as PCP - General (Family Medicine)  Tammy Cruz DO as PCP - [employer] PCP     Review of Systems   Constitutional:  Negative for chills and fever.   HENT:  Negative for trouble swallowing.    Eyes:  Negative for visual disturbance.   Respiratory:  Negative for cough, chest tightness, shortness of breath and wheezing.    Cardiovascular:  Negative for chest pain and palpitations.   Gastrointestinal:  Negative for  "abdominal pain, blood in stool, constipation and diarrhea.   Genitourinary:  Negative for difficulty urinating, vaginal bleeding, vaginal discharge and vaginal pain.   Neurological:  Negative for dizziness, weakness, light-headedness and headaches.       Objective   Vitals:  /62 (BP Location: Left arm, Patient Position: Sitting, BP Cuff Size: Adult)   Pulse 64   Temp 36.5 °C (97.7 °F) (Temporal)   Ht 1.702 m (5' 7\")   Wt 89.4 kg (197 lb)   SpO2 98%   BMI 30.85 kg/m²       Physical Exam  Constitutional:       Appearance: Normal appearance.   HENT:      Right Ear: Tympanic membrane, ear canal and external ear normal.      Left Ear: Tympanic membrane, ear canal and external ear normal.      Mouth/Throat:      Mouth: Mucous membranes are moist.      Pharynx: Oropharynx is clear.   Eyes:      Extraocular Movements: Extraocular movements intact.      Conjunctiva/sclera: Conjunctivae normal.      Pupils: Pupils are equal, round, and reactive to light.   Cardiovascular:      Rate and Rhythm: Normal rate and regular rhythm.      Pulses: Normal pulses.      Heart sounds: Normal heart sounds.   Pulmonary:      Effort: Pulmonary effort is normal.      Breath sounds: Normal breath sounds. No wheezing, rhonchi or rales.   Abdominal:      General: Abdomen is flat.      Palpations: Abdomen is soft.      Tenderness: There is no abdominal tenderness.   Musculoskeletal:         General: Normal range of motion.      Cervical back: Neck supple.   Skin:     General: Skin is warm and dry.   Neurological:      Mental Status: She is alert.   Psychiatric:         Mood and Affect: Mood normal.         Behavior: Behavior normal.         Assessment & Plan  Routine general medical examination at health care facility  Well adult exam.  1. Age appropriate preventative measures reviewed.   2. Encouraged healthy diet and exercise.  3. Immunizations- Reviewed and discussed  4. Labs- ordered  5. Medications- Reviewed    Orders:    1 Year " Follow Up In Primary Care - Wellness Exam; Future    Abnormal thyroid function test  Update labs. Prior low TSH/high FT4 1/2024, I do not see it was ever repeated  Orders:    Tsh With Reflex To Free T4 If Abnormal; Future    Dyslipidemia  Update lipid panel. Continue medication. Recommend healthy diet  that includes lean proteins, vegetables, fruits, and whole grains.  Limit saturated fats, excess sodium, and processed foods.  Limit sugary drinks and sweets.  Moderate exercise at least 30 minutes per day, 5 days per week.    Orders:    Comprehensive Metabolic Panel; Future    Lipid Panel; Future    PAF (paroxysmal atrial fibrillation) (Multi)  Rate controlled and diltiazem and anticoagulated. Continue cardiology follow up.        Prediabetes  Update labs.   Orders:    Comprehensive Metabolic Panel; Future    Hemoglobin A1C; Future    Hypertension, unspecified type  Well controlled on current meds, no change in management. Meds managed by cardiology  Labs to be updated  Continue dietary efforts and physical activity

## 2025-06-05 NOTE — ASSESSMENT & PLAN NOTE
Update lipid panel. Continue medication. Recommend healthy diet  that includes lean proteins, vegetables, fruits, and whole grains.  Limit saturated fats, excess sodium, and processed foods.  Limit sugary drinks and sweets.  Moderate exercise at least 30 minutes per day, 5 days per week.    Orders:    Comprehensive Metabolic Panel; Future    Lipid Panel; Future

## 2025-06-17 DIAGNOSIS — R05.3 CHRONIC COUGH: Primary | ICD-10-CM

## 2025-06-17 LAB
ALBUMIN SERPL-MCNC: 3.9 G/DL (ref 3.6–5.1)
ALP SERPL-CCNC: 98 U/L (ref 37–153)
ALT SERPL-CCNC: 15 U/L (ref 6–29)
ANION GAP SERPL CALCULATED.4IONS-SCNC: 10 MMOL/L (CALC) (ref 7–17)
AST SERPL-CCNC: 15 U/L (ref 10–35)
BILIRUB SERPL-MCNC: 0.4 MG/DL (ref 0.2–1.2)
BUN SERPL-MCNC: 13 MG/DL (ref 7–25)
CALCIUM SERPL-MCNC: 9 MG/DL (ref 8.6–10.4)
CHLORIDE SERPL-SCNC: 102 MMOL/L (ref 98–110)
CHOLEST SERPL-MCNC: 265 MG/DL
CHOLEST/HDLC SERPL: 4.3 (CALC)
CO2 SERPL-SCNC: 30 MMOL/L (ref 20–32)
CREAT SERPL-MCNC: 0.77 MG/DL (ref 0.6–1)
EGFRCR SERPLBLD CKD-EPI 2021: 82 ML/MIN/1.73M2
EST. AVERAGE GLUCOSE BLD GHB EST-MCNC: 123 MG/DL
EST. AVERAGE GLUCOSE BLD GHB EST-SCNC: 6.8 MMOL/L
GLUCOSE SERPL-MCNC: 86 MG/DL (ref 65–99)
HBA1C MFR BLD: 5.9 %
HDLC SERPL-MCNC: 61 MG/DL
LDLC SERPL CALC-MCNC: 170 MG/DL (CALC)
NONHDLC SERPL-MCNC: 204 MG/DL (CALC)
POTASSIUM SERPL-SCNC: 3.8 MMOL/L (ref 3.5–5.3)
PROT SERPL-MCNC: 6.7 G/DL (ref 6.1–8.1)
SODIUM SERPL-SCNC: 142 MMOL/L (ref 135–146)
TRIGL SERPL-MCNC: 184 MG/DL
TSH SERPL-ACNC: 1.41 MIU/L (ref 0.4–4.5)

## 2025-06-17 RX ORDER — METHYLPREDNISOLONE 4 MG/1
TABLET ORAL
Qty: 21 TABLET | Refills: 0 | Status: SHIPPED | OUTPATIENT
Start: 2025-06-17

## 2025-07-03 ENCOUNTER — TELEPHONE (OUTPATIENT)
Dept: PRIMARY CARE | Facility: CLINIC | Age: 70
End: 2025-07-03
Payer: MEDICARE

## 2025-08-18 ENCOUNTER — APPOINTMENT (OUTPATIENT)
Dept: OTOLARYNGOLOGY | Facility: CLINIC | Age: 70
End: 2025-08-18
Payer: MEDICARE

## 2025-08-18 VITALS — WEIGHT: 194 LBS | BODY MASS INDEX: 30.45 KG/M2 | HEIGHT: 67 IN | TEMPERATURE: 96.2 F

## 2025-08-18 DIAGNOSIS — R05.3 CHRONIC COUGH: Primary | ICD-10-CM

## 2025-08-18 PROCEDURE — 3008F BODY MASS INDEX DOCD: CPT | Performed by: OTOLARYNGOLOGY

## 2025-08-18 PROCEDURE — 99213 OFFICE O/P EST LOW 20 MIN: CPT | Performed by: OTOLARYNGOLOGY

## 2025-08-18 PROCEDURE — 1036F TOBACCO NON-USER: CPT | Performed by: OTOLARYNGOLOGY

## 2025-08-18 PROCEDURE — 1159F MED LIST DOCD IN RCRD: CPT | Performed by: OTOLARYNGOLOGY

## 2025-08-18 RX ORDER — TRAMADOL HYDROCHLORIDE 50 MG/1
50 TABLET, FILM COATED ORAL 2 TIMES DAILY PRN
Qty: 60 TABLET | Refills: 1 | Status: SHIPPED | OUTPATIENT
Start: 2025-08-18

## 2026-06-08 ENCOUNTER — APPOINTMENT (OUTPATIENT)
Dept: PRIMARY CARE | Facility: CLINIC | Age: 71
End: 2026-06-08
Payer: MEDICARE